# Patient Record
Sex: FEMALE | Race: OTHER | Employment: UNEMPLOYED | URBAN - METROPOLITAN AREA
[De-identification: names, ages, dates, MRNs, and addresses within clinical notes are randomized per-mention and may not be internally consistent; named-entity substitution may affect disease eponyms.]

---

## 2016-10-13 LAB
ANTIBODY SCREEN, EXTERNAL: NEGATIVE
CHLAMYDIA, EXTERNAL: NEGATIVE
HBSAG, EXTERNAL: NEGATIVE
HIV, EXTERNAL: NON REACTIVE
N. GONORRHEA, EXTERNAL: NEGATIVE
RUBELLA, EXTERNAL: NORMAL

## 2016-12-01 LAB — T. PALLIDUM, EXTERNAL: NEGATIVE

## 2017-01-26 LAB — GRBS, EXTERNAL: NEGATIVE

## 2017-02-13 ENCOUNTER — ANESTHESIA EVENT (OUTPATIENT)
Dept: LABOR AND DELIVERY | Age: 27
DRG: 560 | End: 2017-02-13
Payer: COMMERCIAL

## 2017-02-13 ENCOUNTER — ANESTHESIA (OUTPATIENT)
Dept: LABOR AND DELIVERY | Age: 27
DRG: 560 | End: 2017-02-13
Payer: COMMERCIAL

## 2017-02-13 ENCOUNTER — HOSPITAL ENCOUNTER (INPATIENT)
Age: 27
LOS: 2 days | Discharge: HOME | DRG: 560 | End: 2017-02-15
Attending: OBSTETRICS & GYNECOLOGY | Admitting: OBSTETRICS & GYNECOLOGY
Payer: COMMERCIAL

## 2017-02-13 PROBLEM — Z34.90 PREGNANCY: Status: ACTIVE | Noted: 2017-02-13

## 2017-02-13 LAB
AMPHET UR QL SCN: NEGATIVE
BARBITURATES UR QL SCN: NEGATIVE
BASOPHILS # BLD AUTO: 0 K/UL (ref 0–0.1)
BASOPHILS # BLD: 0 % (ref 0–1)
BENZODIAZ UR QL: NEGATIVE
CANNABINOIDS UR QL SCN: POSITIVE
COCAINE UR QL SCN: NEGATIVE
DRUG SCRN COMMENT,DRGCM: ABNORMAL
EOSINOPHIL # BLD: 0.1 K/UL (ref 0–0.4)
EOSINOPHIL NFR BLD: 1 % (ref 0–7)
ERYTHROCYTE [DISTWIDTH] IN BLOOD BY AUTOMATED COUNT: 13.5 % (ref 11.5–14.5)
HCT VFR BLD AUTO: 36.3 % (ref 35–47)
HGB BLD-MCNC: 12 G/DL (ref 11.5–16)
LYMPHOCYTES # BLD AUTO: 20 % (ref 12–49)
LYMPHOCYTES # BLD: 2.7 K/UL (ref 0.8–3.5)
MCH RBC QN AUTO: 28.4 PG (ref 26–34)
MCHC RBC AUTO-ENTMCNC: 33.1 G/DL (ref 30–36.5)
MCV RBC AUTO: 85.8 FL (ref 80–99)
METHADONE UR QL: NEGATIVE
MONOCYTES # BLD: 0.6 K/UL (ref 0–1)
MONOCYTES NFR BLD AUTO: 5 % (ref 5–13)
NEUTS SEG # BLD: 10 K/UL (ref 1.8–8)
NEUTS SEG NFR BLD AUTO: 74 % (ref 32–75)
OPIATES UR QL: NEGATIVE
PCP UR QL: NEGATIVE
PLATELET # BLD AUTO: 226 K/UL (ref 150–400)
RBC # BLD AUTO: 4.23 M/UL (ref 3.8–5.2)
WBC # BLD AUTO: 13.4 K/UL (ref 3.6–11)

## 2017-02-13 PROCEDURE — 75410000000 HC DELIVERY VAGINAL/SINGLE

## 2017-02-13 PROCEDURE — 85025 COMPLETE CBC W/AUTO DIFF WBC: CPT | Performed by: OBSTETRICS & GYNECOLOGY

## 2017-02-13 PROCEDURE — 76060000078 HC EPIDURAL ANESTHESIA

## 2017-02-13 PROCEDURE — 74011250636 HC RX REV CODE- 250/636

## 2017-02-13 PROCEDURE — 36415 COLL VENOUS BLD VENIPUNCTURE: CPT | Performed by: OBSTETRICS & GYNECOLOGY

## 2017-02-13 PROCEDURE — 74011000258 HC RX REV CODE- 258: Performed by: OBSTETRICS & GYNECOLOGY

## 2017-02-13 PROCEDURE — 75410000002 HC LABOR FEE PER 1 HR

## 2017-02-13 PROCEDURE — 77030034849

## 2017-02-13 PROCEDURE — 10907ZC DRAINAGE OF AMNIOTIC FLUID, THERAPEUTIC FROM PRODUCTS OF CONCEPTION, VIA NATURAL OR ARTIFICIAL OPENING: ICD-10-PCS | Performed by: OBSTETRICS & GYNECOLOGY

## 2017-02-13 PROCEDURE — 77030007880 HC KT SPN EPDRL BBMI -B

## 2017-02-13 PROCEDURE — 65410000002 HC RM PRIVATE OB

## 2017-02-13 PROCEDURE — 3E0R3CZ INTRODUCE REGIONAL ANESTH IN SPINAL CANAL, PERC: ICD-10-PCS | Performed by: OBSTETRICS & GYNECOLOGY

## 2017-02-13 PROCEDURE — 77030003666 HC NDL SPINAL BD -A

## 2017-02-13 PROCEDURE — 80307 DRUG TEST PRSMV CHEM ANLYZR: CPT | Performed by: OBSTETRICS & GYNECOLOGY

## 2017-02-13 PROCEDURE — 77030018749 HC HK AMNIO DISP DERY -A

## 2017-02-13 PROCEDURE — 74011250637 HC RX REV CODE- 250/637: Performed by: OBSTETRICS & GYNECOLOGY

## 2017-02-13 PROCEDURE — 77030014125 HC TY EPDRL BBMI -B: Performed by: ANESTHESIOLOGY

## 2017-02-13 PROCEDURE — 00HU33Z INSERTION OF INFUSION DEVICE INTO SPINAL CANAL, PERCUTANEOUS APPROACH: ICD-10-PCS | Performed by: OBSTETRICS & GYNECOLOGY

## 2017-02-13 PROCEDURE — 75410000003 HC RECOV DEL/VAG/CSECN EA 0.5 HR

## 2017-02-13 PROCEDURE — 74011000250 HC RX REV CODE- 250

## 2017-02-13 PROCEDURE — 74011250636 HC RX REV CODE- 250/636: Performed by: OBSTETRICS & GYNECOLOGY

## 2017-02-13 PROCEDURE — 77030021125

## 2017-02-13 RX ORDER — SODIUM CHLORIDE 0.9 % (FLUSH) 0.9 %
5-10 SYRINGE (ML) INJECTION EVERY 8 HOURS
Status: DISCONTINUED | OUTPATIENT
Start: 2017-02-13 | End: 2017-02-15 | Stop reason: HOSPADM

## 2017-02-13 RX ORDER — AMPICILLIN 1 G/1
INJECTION, POWDER, FOR SOLUTION INTRAMUSCULAR; INTRAVENOUS
Status: DISCONTINUED
Start: 2017-02-13 | End: 2017-02-13

## 2017-02-13 RX ORDER — BUPIVACAINE HYDROCHLORIDE AND EPINEPHRINE 2.5; 5 MG/ML; UG/ML
INJECTION, SOLUTION EPIDURAL; INFILTRATION; INTRACAUDAL; PERINEURAL
Status: DISCONTINUED
Start: 2017-02-13 | End: 2017-02-13

## 2017-02-13 RX ORDER — BUPIVACAINE HYDROCHLORIDE AND EPINEPHRINE 2.5; 5 MG/ML; UG/ML
INJECTION, SOLUTION EPIDURAL; INFILTRATION; INTRACAUDAL; PERINEURAL AS NEEDED
Status: DISCONTINUED | OUTPATIENT
Start: 2017-02-13 | End: 2017-02-13 | Stop reason: HOSPADM

## 2017-02-13 RX ORDER — OXYTOCIN IN 5 % DEXTROSE 30/500 ML
PLASTIC BAG, INJECTION (ML) INTRAVENOUS
Status: COMPLETED
Start: 2017-02-13 | End: 2017-02-13

## 2017-02-13 RX ORDER — FENTANYL/BUPIVACAINE/NS/PF 2-1250MCG
PREFILLED PUMP RESERVOIR EPIDURAL
Status: COMPLETED
Start: 2017-02-13 | End: 2017-02-13

## 2017-02-13 RX ORDER — OXYTOCIN/RINGER'S LACTATE 20/1000 ML
125-500 PLASTIC BAG, INJECTION (ML) INTRAVENOUS ONCE
Status: ACTIVE | OUTPATIENT
Start: 2017-02-13 | End: 2017-02-14

## 2017-02-13 RX ORDER — SODIUM CHLORIDE, SODIUM LACTATE, POTASSIUM CHLORIDE, CALCIUM CHLORIDE 600; 310; 30; 20 MG/100ML; MG/100ML; MG/100ML; MG/100ML
125 INJECTION, SOLUTION INTRAVENOUS CONTINUOUS
Status: DISCONTINUED | OUTPATIENT
Start: 2017-02-13 | End: 2017-02-13

## 2017-02-13 RX ORDER — AMOXICILLIN 250 MG/1
500 CAPSULE ORAL EVERY 8 HOURS
Status: DISCONTINUED | OUTPATIENT
Start: 2017-02-13 | End: 2017-02-15 | Stop reason: HOSPADM

## 2017-02-13 RX ORDER — SODIUM CHLORIDE 0.9 % (FLUSH) 0.9 %
5-10 SYRINGE (ML) INJECTION EVERY 8 HOURS
Status: DISCONTINUED | OUTPATIENT
Start: 2017-02-13 | End: 2017-02-13 | Stop reason: HOSPADM

## 2017-02-13 RX ORDER — NALOXONE HYDROCHLORIDE 0.4 MG/ML
0.4 INJECTION, SOLUTION INTRAMUSCULAR; INTRAVENOUS; SUBCUTANEOUS AS NEEDED
Status: DISCONTINUED | OUTPATIENT
Start: 2017-02-13 | End: 2017-02-13

## 2017-02-13 RX ORDER — SODIUM CHLORIDE 900 MG/100ML
INJECTION INTRAVENOUS
Status: DISCONTINUED
Start: 2017-02-13 | End: 2017-02-13

## 2017-02-13 RX ORDER — OXYTOCIN IN 5 % DEXTROSE 30/500 ML
1-25 PLASTIC BAG, INJECTION (ML) INTRAVENOUS
Status: DISCONTINUED | OUTPATIENT
Start: 2017-02-13 | End: 2017-02-13

## 2017-02-13 RX ORDER — SODIUM CHLORIDE 0.9 % (FLUSH) 0.9 %
5-10 SYRINGE (ML) INJECTION AS NEEDED
Status: DISCONTINUED | OUTPATIENT
Start: 2017-02-13 | End: 2017-02-13

## 2017-02-13 RX ORDER — IBUPROFEN 400 MG/1
800 TABLET ORAL EVERY 8 HOURS
Status: DISCONTINUED | OUTPATIENT
Start: 2017-02-13 | End: 2017-02-15 | Stop reason: HOSPADM

## 2017-02-13 RX ORDER — NALOXONE HYDROCHLORIDE 0.4 MG/ML
0.4 INJECTION, SOLUTION INTRAMUSCULAR; INTRAVENOUS; SUBCUTANEOUS AS NEEDED
Status: DISCONTINUED | OUTPATIENT
Start: 2017-02-13 | End: 2017-02-15 | Stop reason: HOSPADM

## 2017-02-13 RX ORDER — ACETAMINOPHEN 325 MG/1
650 TABLET ORAL
Status: DISCONTINUED | OUTPATIENT
Start: 2017-02-13 | End: 2017-02-15 | Stop reason: HOSPADM

## 2017-02-13 RX ORDER — FENTANYL/BUPIVACAINE/NS/PF 2-1250MCG
1-16 PREFILLED PUMP RESERVOIR EPIDURAL CONTINUOUS
Status: DISCONTINUED | OUTPATIENT
Start: 2017-02-13 | End: 2017-02-13

## 2017-02-13 RX ORDER — HYDROCORTISONE ACETATE PRAMOXINE HCL 2.5; 1 G/100G; G/100G
CREAM TOPICAL AS NEEDED
Status: DISCONTINUED | OUTPATIENT
Start: 2017-02-13 | End: 2017-02-15 | Stop reason: HOSPADM

## 2017-02-13 RX ADMIN — AMOXICILLIN 500 MG: 250 CAPSULE ORAL at 15:29

## 2017-02-13 RX ADMIN — BUPIVACAINE HYDROCHLORIDE AND EPINEPHRINE 5 ML: 2.5; 5 INJECTION, SOLUTION EPIDURAL; INFILTRATION; INTRACAUDAL; PERINEURAL at 09:11

## 2017-02-13 RX ADMIN — FENTANYL 0.2 MG/100ML-BUPIV 0.125%-NACL 0.9% EPIDURAL INJ 12 ML: 2/0.125 SOLUTION at 09:26

## 2017-02-13 RX ADMIN — BUPIVACAINE HYDROCHLORIDE AND EPINEPHRINE 5 ML: 2.5; 5 INJECTION, SOLUTION EPIDURAL; INFILTRATION; INTRACAUDAL; PERINEURAL at 09:16

## 2017-02-13 RX ADMIN — Medication 12 ML: at 09:26

## 2017-02-13 RX ADMIN — SODIUM CHLORIDE, SODIUM LACTATE, POTASSIUM CHLORIDE, AND CALCIUM CHLORIDE 125 ML/HR: 600; 310; 30; 20 INJECTION, SOLUTION INTRAVENOUS at 07:40

## 2017-02-13 RX ADMIN — AMPICILLIN SODIUM 1 G: 1 INJECTION, POWDER, FOR SOLUTION INTRAMUSCULAR; INTRAVENOUS at 09:53

## 2017-02-13 RX ADMIN — IBUPROFEN 800 MG: 400 TABLET ORAL at 14:35

## 2017-02-13 RX ADMIN — IBUPROFEN 800 MG: 400 TABLET ORAL at 22:12

## 2017-02-13 RX ADMIN — Medication 2 MILLI-UNITS/MIN: at 08:30

## 2017-02-13 RX ADMIN — ACETAMINOPHEN 650 MG: 325 TABLET, FILM COATED ORAL at 19:37

## 2017-02-13 RX ADMIN — SODIUM CHLORIDE, SODIUM LACTATE, POTASSIUM CHLORIDE, AND CALCIUM CHLORIDE 999 ML/HR: 600; 310; 30; 20 INJECTION, SOLUTION INTRAVENOUS at 09:20

## 2017-02-13 RX ADMIN — BUPIVACAINE HYDROCHLORIDE AND EPINEPHRINE 0.5 ML: 2.5; 5 INJECTION, SOLUTION EPIDURAL; INFILTRATION; INTRACAUDAL; PERINEURAL at 09:10

## 2017-02-13 NOTE — LACTATION NOTE
This note was copied from a baby's chart. THC positive drug screen 17  Mother has insisted on putting infant to breast and is found breastfeeding on [address] [facility] rounds. Asked mother if she was aware of adverse effects of THC on infants and she said no, she had not researched. Provided literature with information from AAP listing marijuana of concern which harmful effects have been reported. Literature given states marijuana can cause sleepiness in babies, leading to slow weight gain and possibly slow growth and developmental stages. Babies who's mothers smoke marijuana regularly have a higher risk of SIDS. Asked mother to carefully consider what she is exposing her  to. She is aware that follow up will be provided with  UDS and meconium, referral to care management and CPS, along with her pediatricians knowledge of her choices.

## 2017-02-13 NOTE — IP AVS SNAPSHOT
[address] [facility] 
[phone] Patient: Moon Peterson MRN: [id number] :1990 You are allergic to the following No active allergies Recent Documentation Height Weight Breastfeeding? BMI OB Status Smoking Status 1.676 m 86.2 kg Yes 30.67 kg/m2 Recent pregnancy Former Smoker Unresulted Labs Order Current Status SAMPLE TO BLOOD BANK In process Emergency Contacts Name Discharge Info Relation Home Work Mobile KATTY Libox  Mother [14] [phone] [address] CAREGIVER [3] Boyfriend [17] [phone] About your hospitalization You were admitted on:  2017 You last received care in the:  [facility] You were discharged on:  February 15, 2017 Unit phone number:  [phone] Why you were hospitalized Your primary diagnosis was:  Not on File Your diagnoses also included:  Pregnancy Providers Seen During Your Hospitalizations Provider Role Specialty Primary office phone Reshma Amaro MD Attending Provider Obstetrics & Gynecology [phone] Your Primary Care Physician (PCP) Primary Care Physician Office Phone Office Fax UNKNOWN, PROVIDER ** None ** ** None ** Follow-up Information Follow up With Details Comments Contact Info Provider Unknown   Patient not available to ask Reshma Amaro MD  Follow up with Dr. Amanda Jeffries in 6 weeks and Urologist at [facility] as scheduled Millieida Janiae [facility] [address] [facility] 
[phone] Current Discharge Medication List  
  
START taking these medications Dose & Instructions Dispensing Information Comments Morning Noon Evening Bedtime  
 amoxicillin 500 mg capsule Commonly known as:  AMOXIL Your next dose is: Today, Tomorrow Other:  _________  Dose:  500 mg  
 Take 1 Cap by mouth every eight (8) hours for 7 days. Quantity:  21 Cap Refills:  0  
     
   
   
   
  
 ibuprofen 600 mg tablet Commonly known as:  MOTRIN Your next dose is: Today, Tomorrow Other:  _________ Dose:  600 mg Take 1 Tab by mouth every six (6) hours as needed for Pain for up to 360 days. Quantity:  30 Tab Refills:  0  
     
   
   
   
  
 oxyCODONE-acetaminophen 5-325 mg per tablet Commonly known as:  PERCOCET Your next dose is: Today, Tomorrow Other:  _________ Dose:  1-2 Tab Take 1-2 Tabs by mouth every four (4) hours as needed for Pain. Max Daily Amount: 12 Tabs. Quantity:  30 Tab Refills:  0 CONTINUE these medications which have NOT CHANGED Dose & Instructions Dispensing Information Comments Morning Noon Evening Bedtime PRENATAL DHA+COMPLETE PRENATAL -300 mg-mcg-mg Cmpk Generic drug:  PNV no.24-iron-folic acid-dha Your next dose is: Today, Tomorrow Other:  _________ Take  by mouth. Refills:  0 ASK your doctor about these medications Dose & Instructions Dispensing Information Comments Morning Noon Evening Bedtime KEFLEX 500 mg capsule Generic drug:  cephALEXin Your next dose is: Today, Tomorrow Other:  _________ Dose:  500 mg Take 500 mg by mouth two (2) times a day. Refills:  0 Where to Get Your Medications Information on where to get these meds will be given to you by the nurse or doctor. ! Ask your nurse or doctor about these medications  
  amoxicillin 500 mg capsule  
 ibuprofen 600 mg tablet  
 oxyCODONE-acetaminophen 5-325 mg per tablet Discharge Instructions POST DELIVERY DISCHARGE INSTRUCTIONS Name: Mak [facility] YOB: 1990 Primary Diagnosis: Active Problems: 
  Pregnancy (2/13/2017) General:  
 
Diet/Diet Restrictions: 
· Eight 8-ounce glasses of fluid daily (water, juices); avoid excessive caffeine intake. · Meals/snacks as desired which are high in fiber and carbohydrates and low in fat and cholesterol. Medications:  
 
 
 
Physical Activity / Restrictions / Safety: · Avoid heavy lifting, no more that 8 lbs. For 2-3 weeks;  
· Limit use of stairs to 2 times daily for the first week home. · No driving for one week. · Avoid intercourse 4-6 weeks, no douching or tampon use. · Check with obstetrician before starting or resuming an exercise program.   
 
Discharge Instructions/Special Treatment/Home Care Needs:  
 
· Continue prenatal vitamins. · Continue to use squirt bottle with warm water on your episiotomy after each bathroom use until bleeding stops. · If steri-strips applied to your incision, remove in 7-10 days. Call your doctor for the following: · Fever over 101 degrees by mouth. · Vaginal bleeding heavier than a normal menstrual period or clots larger than a golf ball. · Red streaks or increased swelling of legs, painful red streaks on your breast. 
· Painful urination, constipation and increased pain or swelling or discharge with your incision. · If you feel extremely anxious or overwhelmed. · If you have thoughts of harming yourself and/or your baby. Pain Management:  
 
· Take Acetaminophen (Tylenol) or Ibuprofen (Advil, Motrin), as directed for pain. · Use a warm Sitz bath 3 times daily to relieve episiotomy or hemorrhoidal discomfort. · For hemorrhoidal discomfort, use Tucks and Anusol cream as needed and directed. · Heating pad to  incision as needed. Follow-Up Care:  
 
Appointment with MD: Follow-up Appointments Procedures  FOLLOW UP VISIT Appointment in: 6 Weeks With Dr. Wanda Lozano for postpartum check and with urologicst at [facility] as scheduled With Dr. Wanda Lozano for postpartum check and with urologicst at [facility] as scheduled Standing Status:   Standing Number of Occurrences:   1 Order Specific Question:   Appointment in Answer:   6 Weeks Telephone number: [phone] Signed By: Mendel Can, MD                                                                                                   Date: 2/15/2017 Time: 9:37 AM 
 
Discharge Orders None Ludi labshart Announcement We are excited to announce that we are making your provider's discharge notes available to you in Abril. You will see these notes when they are completed and signed by the physician that discharged you from your recent hospital stay. If you have any questions or concerns about any information you see in FanIQt, please call the Health Information Department where you were seen or reach out to your Primary Care Provider for more information about your plan of care. Introducing [facility]! [employer] introduces Abril patient portal. Now you can access parts of your medical record, email your doctor's office, and request medication refills online. 1. In your internet browser, go to [email or web address] 2. Click on the First Time User? Click Here link in the Sign In box. You will see the New Member Sign Up page. 3. Enter your Abril Access Code exactly as it appears below. You will not need to use this code after youve completed the sign-up process. If you do not sign up before the expiration date, you must request a new code. · Abril Access Code: [email or web address] Expires: 5/16/2017 10:05 AM 
 
4. Enter the last four digits of your Social Security Number (xxxx) and Date of Birth (mm/dd/yyyy) as indicated and click Submit. You will be taken to the next sign-up page. 5. Create a Abril ID. This will be your Abril login ID and cannot be changed, so think of one that is secure and easy to remember. 6. Create a FanIQt password. You can change your password at any time. 7. Enter your Password Reset Question and Answer. This can be used at a later time if you forget your password. 8. Enter your e-mail address. You will receive e-mail notification when new information is available in [address] 9. Click Sign Up. You can now view and download portions of your medical record. 10. Click the Download Summary menu link to download a portable copy of your medical information. If you have questions, please visit the Frequently Asked Questions section of the [email or web address] website. Remember, [email or web address] is NOT to be used for urgent needs. For medical emergencies, dial 911. Now available from your iPhone and Android! General Information Please provide this summary of care documentation to your next provider. Patient Signature:  ____________________________________________________________ Date:  ____________________________________________________________  
  
Dio Mejia Provider Signature:  ____________________________________________________________ Date:  ____________________________________________________________

## 2017-02-13 NOTE — ANESTHESIA PREPROCEDURE EVALUATION
Anesthetic History   No history of anesthetic complications            Review of Systems / Medical History  Patient summary reviewed, nursing notes reviewed and pertinent labs reviewed    Pulmonary  Within defined limits                 Neuro/Psych   Within defined limits           Cardiovascular  Within defined limits                Exercise tolerance: >4 METS     GI/Hepatic/Renal  Within defined limits              Endo/Other  Within defined limits           Other Findings   Comments: IUP           Physical Exam    Airway  Mallampati: II  TM Distance: 4 - 6 cm  Neck ROM: normal range of motion   Mouth opening: Normal     Cardiovascular               Dental  No notable dental hx       Pulmonary                 Abdominal  GI exam deferred       Other Findings            Anesthetic Plan    ASA: 2  Anesthesia type: spinal and epidural            Anesthetic plan and risks discussed with: Patient

## 2017-02-13 NOTE — L&D DELIVERY NOTE
Delivery Summary  Patient: Linden Weiner             Circumcision:   NA-female  Additional Delivery Comments - . Female. Information for the patient's :  Priscila Smaller [[id number]]       Labor Events:    Labor: No   Rupture Date: 2017   Rupture Time: 8:25 AM   Rupture Type AROM   Amniotic Fluid Volume: Moderate    Amniotic Fluid Description: Clear     Induction: AROM; Oxytocin       Augmentation: None   Labor Events: None     Cervical Ripening:     None     Delivery Events:  Episiotomy: None   Laceration(s): Right periurethral     Repaired: None    Number of Repair Packets:     Suture Type and Size: None     Estimated Blood Loss (ml): 250ml       Delivery Date: 2017    Delivery Time: 12:13 PM  Delivery Type: Vaginal, Spontaneous Delivery  Sex:  Female     Gestational Age: 38w11d   Delivery Clinician:  Yaquelin Cerna  Living Status: Yes   Delivery Location: L&D [facility]          APGARS  One minute Five minutes Ten minutes   Skin color: 0   1        Heart rate: 2   2        Grimace: 2   2        Muscle tone: 2   2        Breathin   2        Totals: 8   9            Presentation: Vertex    Position:        Resuscitation Method:  Suctioning-bulb; Tactile Stimulation     Meconium Stained: None      Cord Vessels: 3 Vessels      Cord Events:    Cord Blood Sent?:  No    Blood Gases Sent?:  No    Placenta:  Date/Time:  12:18 PM  Removal: Spontaneous      Appearance: Intact      Measurements:  Birth Weight: 3.1 kg      Birth Length: 50.8 cm      Head Circumference: 33 cm      Chest Circumference: 30.5 cm     Abdominal Girth: 33 cm    Other Providers:   TRISTAN VELOZ;BARRY PEGUERO;MIRA HUMMEL;NIGHAT MENDOZA, Obstetrician;Primary Nurse;Primary  Nurse;Charge Nurse           Cord pH:  none    Episiotomy: None   Laceration(s): Right periurethral      Estimated Blood Loss (ml): 250    Labor Events  Method: AROM; Oxytocin       Augmentation: None   Cervical Ripening: None        Operative Vaginal Delivery - none    Group B Strep:   Lab Results   Component Value Date/Time    GrBStrep, External negative 2017     Information for the patient's :  Jevon Wright [[id number]]   No results found for: ABORH, PCTABR, PCTDIG, BILI, ABORHEXT, ABORH    No results found for: APH, APCO2, APO2, AHCO3, ABEC, ABDC, O2ST, EPHV, PCO2V, PO2V, HCO3V, EBEV, EBDV, SITE, RSCOM

## 2017-02-13 NOTE — PROGRESS NOTES
0700- pt arrived from home for scheduled elective indx. , prev IUFD, pt denies any leaking of fluid, bleeding, spots in eyes, but reports some contractions. Pt to br and gowned, then back to bed on efm and toco. Consents reviewed. 0825-  in. Strip reviewed. sve done with arom. Clear fluid noted. Pt requesting epidural, fluid bolus started    0845- pt sts has been using marijuana, drug screen explained and done. Pt educated on no breast feeding due to marijuana use    0900- dr. Kathi Kennedy in. Hx reviewed. Time out done. Pt to sitting position, maternal hr tracing intermittently    0935- pt c/o some pain on left side, pt repositioned to far left and pcea button given with education    1010- pt sts pain on left side continues. Dr. Kathi Kennedy notified, will come assess shortly    1030- pt getting relief at this time. Dr. Kathi Kennedy made aware     50 [id number]- dr. Mahesh Diop in. Strip reviewed. sve done.  Pt complete    1213-  of live female infant    0- dr. Mahesh Diop ordered urology consult    9502- dr. Mohsen Sheehan called back and will have someone consult on pt    12- sbar report to arnel moss

## 2017-02-13 NOTE — ANESTHESIA PROCEDURE NOTES
CSE Block    Performed by: Rivka Conde  Authorized by: Rivka Conde     Pre-Procedure      OB Combined Spinal-Epidural Note    Risk and benefits were discussed with the patient and plans are to proceed. Patient was placed in the seated position. The back was prepped at the lumbar region with Duraprep. 3 ml 0.25% bupivacaine with 1:200K epinephrine  was used as local at L3 - L4. A 17 Tuohy needle was passed x 1 attempt(s) at the above stated level. Loss of resistance at 7 cm, deviated 2 cm to left. A 25 g pencil point spinal needle was placed through the Touhy until CSF was obtained. 0.5 mL 0.25% bupivacaine with 1:200K epinephrine was deposited into the CSF. A 19 g spring type epidural catheter was placed through the Touhy and secured at 12 cm at the skin. Test dose with 5 mL 0.25% bupivacaine with 1:200 epinephrine was negative. No paresthesia.     Ashley Thompson MD  2/13/2017

## 2017-02-13 NOTE — PROGRESS NOTES
TRANSFER - IN REPORT:    Verbal report received from JOSE ANGEL Calles RN(name) on [facility] [place]  being received from L&D(unit) for routine progression of care      Report consisted of patients Situation, Background, Assessment and   Recommendations(SBAR). Information from the following report(s) SBAR, Procedure Summary, Intake/Output, MAR and Recent Results was reviewed with the receiving nurse. Opportunity for questions and clarification was provided. Assessment completed upon patients arrival to unit and care assumed.

## 2017-02-13 NOTE — IP AVS SNAPSHOT
Current Discharge Medication List  
  
Take these medications at their scheduled times Dose & Instructions Dispensing Information Comments Morning Noon Evening Bedtime  
 amoxicillin 500 mg capsule Commonly known as:  AMOXIL Your next dose is: Today, Tomorrow Other:  ____________ Dose:  500 mg Take 1 Cap by mouth every eight (8) hours for 7 days. Quantity:  21 Cap Refills:  0 Take these medications as needed Dose & Instructions Dispensing Information Comments Morning Noon Evening Bedtime  
 ibuprofen 600 mg tablet Commonly known as:  MOTRIN Your next dose is: Today, Tomorrow Other:  ____________ Dose:  600 mg Take 1 Tab by mouth every six (6) hours as needed for Pain for up to 360 days. Quantity:  30 Tab Refills:  0  
     
   
   
   
  
 oxyCODONE-acetaminophen 5-325 mg per tablet Commonly known as:  PERCOCET Your next dose is: Today, Tomorrow Other:  ____________ Dose:  1-2 Tab Take 1-2 Tabs by mouth every four (4) hours as needed for Pain. Max Daily Amount: 12 Tabs. Quantity:  30 Tab Refills:  0 Take these medications as directed Dose & Instructions Dispensing Information Comments Morning Noon Evening Bedtime PRENATAL DHA+COMPLETE PRENATAL -300 mg-mcg-mg Cmpk Generic drug:  PNV no.24-iron-folic acid-dha Your next dose is: Today, Tomorrow Other:  ____________ Take  by mouth. Refills:  0 ASK your doctor about these medications Dose & Instructions Dispensing Information Comments Morning Noon Evening Bedtime KEFLEX 500 mg capsule Generic drug:  cephALEXin Your next dose is: Today, Tomorrow Other:  ____________ Dose:  500 mg Take 500 mg by mouth two (2) times a day. Refills:  0 Where to Get Your Medications Information about where to get these medications is not yet available ! Ask your nurse or doctor about these medications  
  amoxicillin 500 mg capsule  
 ibuprofen 600 mg tablet  
 oxyCODONE-acetaminophen 5-325 mg per tablet

## 2017-02-13 NOTE — H&P
EDC:02/15/2017  EGA: 39 weeks, 5 days      History of Present Illness:  28 yo  at 44 5/7 weeks for IOL. preg complicated by chronic hydronephrosis, on suppression. h/o alcohol and MJ use with preg.  h/o IUFD. Reassuring surveillance but pooor complicance with surveillance. Patient's Prenatal Care with Doctor of Record Rodo Penaloza MD Notable For -    *Note: IOL sched for 17 at 6:30am  Son with leukodystrophy-unknown type, seeing [facility] 16-resched from 10/24  Abnormality in fetus know or suspected-left renal agenisis-MaterniT21-screen neg  UTI pregnant ANEESH ____  Anemia on FE pregnant  Alcohol use in pregnancy-drinks daily-stopped at 18 weeks  High risk pregnancy  Kidney stent (right)  chronic hydronephrosis-pregnant (followed by urology)-UPJ obstruction  Prev. Preg w/ Poor Outcome   Lab Screening          Impression & Recommendations:    Problem # 1:  High risk pregnancy (ICD-V23.89) (LTP67-E75.893)  Favorable cervix. arom. pitocin if needed. epidural prn. Problem # 2:  UTI pregnant ANEESH ____ (ASR-416.90) (ZKC21-H07.13)  recent + enterococcus culture.   will treat while in hospital.          Past Medical History:     Reviewed history from 10/20/2016 and no changes required:        kidney doesnt drain properly, has a stent-Ureteropelvic junction obstruction dx at 16 yrs per pt        Drinks alcohol daily-stopped at 18 weeks gestation    Past Surgical History:     Reviewed history from 2015 and no changes required:        right kidney stent        Vaginal Delivery        [id number] , Male  Dr. Isiah Sen    Family History Summary:      Reviewed history Last on 10/30/2016 and no changes required:2017  Mother Phong Ballard.) - Has Family History of Diabetes - Entered On: 2015  Mother Phong Ballard.) - Has Family History of Hypertension - Entered On: 2015  Son Phong Ballard.) - Has Family History of Other Medical Problems - Sickle Cell carrier - Entered On: 2015  Father Kirit Santo.) - Has Family History of Other Cancer - basal cell skin cancer - Entered On: 10/20/2016    General Comments - FH:  Family history transferred to [facility] compliant      Social History:     Reviewed history from 10/13/2016 and no changes required:        Single                Smoking History:        Patient has never smoked. Allergies      Medications Removed from Medication List        Flowsheet View for Follow-up Visit     Estimated weeks of        gestation:  39 5/7     Cx Dilation:  4-5     Cx Effacement: 80%     Cx Station:  -2     Preceptor:  mp      Vital Signs      FHT Descrip:    reactive        Physical Exam     General           General appearance:  no acute distress                  Dilation: : 4-5                  Effacement:  80%                  Station:  -2            Impression & Recommendations:    Problem # 1:  High risk pregnancy (ICD-V23.89) (OAG71-J24.893)  Favorable cervix. arom. pitocin if needed. epidural prn. Problem # 2:  UTI pregnant ANEESH ____ (THM-415.38) (NXQ72-N73.13)  recent + enterococcus culture. will treat while in hospital.      Medications (at conclusion of this visit)    02/09/2017 MACROBID 100 MG ORAL CAPS (NITROFURANTOIN MONOHYD MACRO) 1 PO BID  10/13/2016 PNV PRENATAL PLUS MULTIVITAMIN 27-1 MG ORAL TABS (PRENATAL VIT-FE FUMARATE-FA) take one by mouth daily          LABORATORY DATA   TEST DATE RESULT   Group B Strep culture 01/26/2017 Negative                                   (Group B Strep Culture Result Field)   Blood Type 10/13/2016 O                                             (Blood Type Result Field)   Rh 10/13/2016 Positive                                   (Rh Result Field)   Rhogam Inj Given 07/30/2015 *   Tdap Vaccine Given 01/26/2017 Vacc.  [address]   Antibody Screen 12/01/2016 Negative   Rubella  [facility] Reference Ranges On or After 3/10/14                  <0.90              Non-immune      0.90 - 0.99     Equivocal      >0.99              Immune [facility] Reference Ranges  Before 3/10/14           <5                 Non-immune             5 - 9               Equivocal            >9                 Immune  [facility] Reference Ranges       < Or = 0.90       Negative             0.91-1.09          Equivocal            > Or = 1.10       Positive   10/13/2016     1.45     TPA (T Pallidum Antibodies) 12/01/2016 Negative   Serology (RPR) 07/30/2015 *   HBsAg 10/13/2016 Negative   HIV 10/13/2016 Non Reactive   Hemoglobin 12/01/2016 10.9   Hematocrit 12/01/2016 32.4   Platelets 07/59/8850 282 X10E3/UL   TSH 07/30/2015 *   Urine Culture 01/26/2017 Negative   GC DNA Probe 10/13/2016 Negative   Chlamydia DNA 10/13/2016 Negative   PAP 10/13/2016 NIL   Flu Vaccine Given 12/22/2016 declined   HGBA1C 07/30/2015 *   HGB Electro 06/04/2015 AA   T4, Free 07/30/2015 *   BG Fasting 07/30/2015 *   GTT 1H 50G 12/01/2016 97   GTT 1H 100G 07/30/2015 *   GTT 2H 100G 07/30/2015 *   GTT 3H 100G 07/30/2015 *   Glucose Plasma 07/30/2015 *   CF Accept or Decline 10/20/2016 declined   CF Screen Result     Nuchal Trans 10/13/2016 Too Late   AFP Only     Tetra 10/13/2016 Too Late   AFP Serum 07/30/2015 *   CVS 07/30/2015 *   AFP Amniotic 07/30/2015 *   Amnio Karyo 10/20/2016 declined   FISH 07/30/2015 *   GC Culture 07/30/2015 *   Chlamydia Cult 07/30/2015 *   Ureaplasma     Mycoplasma     WBC 10/13/2016 9.4 X10E3/UL   RBC 10/13/2016 3.82 X10E6/UL   MCV 10/13/2016 88   MCH 10/13/2016 29.3   MCHC RBC 10/13/2016 33.2     ULTRASOUND DATA   TEST DATE RESULT   Estimated Fetal Weight 02/09/2017 0219^4833 g&grams                                     Weight % 02/09/2017 15^15% %&percent                                                LOGAN 02/09/2017 17.9^17.9 cm&centimeters                    BPP 02/09/2017 8^8 [n/a]&Not applicable   Cervical Length (mm)                 ________________________________________________________________________

## 2017-02-13 NOTE — CONSULTS
Urology Consult    Subjective:     Date of Consultation:  February 13, 2017    Referring Physician: Radha Ring    Reason for Consultation:  Chronic stent    Patient Name: Ortiz Yeung  MRN: [id number]    History of Present Illness:   Patient is a 27 y.o.  female who is being seen for  history of right hydronephrosis, UPJ obstruction and stent. She was admitted to the hospital for Induction  Pregnancy. she has a history of a right UPJ obstruction with chronic stent changes. Managed most recently at [facility] by Dr. Lesa enrique  She states that her most recent stent change was about 6 or 7 months ago or perhaps just prior to pregnancy. She denies any significant stent discomfort. she is being treated for a chronic UTI. recentlyRchanged to a different antibiotic based on culture from [facility] office. Past Medical History   Diagnosis Date    Abnormal Pap smear     Anemia     Chronic kidney disease     Chronic pain      abdominal,lower right flank    Hydronephrosis, right 8/22/2014      Past Surgical History   Procedure Laterality Date    Hx gyn       vaginal birth x 2    Hx urological       stent placements    Hx other surgical  2016 August      urethral stent    Hx other surgical       urethral stents replaced multiple times       Family History   Problem Relation Age of Onset    Elevated Lipids Mother     Hypertension Mother     Cancer Paternal Aunt     Cancer Paternal Uncle     Arthritis-osteo Maternal Grandmother     Diabetes Maternal Grandmother     Cancer Paternal Grandmother     Diabetes Paternal Grandmother       Social History   Substance Use Topics    Smoking status: Former Smoker     Years: 2.00    Smokeless tobacco: Never Used    Alcohol use No      Comment: ocassionaly     No Known Allergies   Prior to Admission medications    Medication Sig Start Date End Date Taking? Authorizing Provider   PNV no.24-iron-folic acid-dha (PRENATAL DHA+COMPLETE PRENATAL) [id number] mg-mcg-mg cmpk Take  by mouth. Yes Historical Provider   cephALEXin (KEFLEX) 500 mg capsule Take 500 mg by mouth two (2) times a day.     Historical Provider             Objective:     Data Review (Labs):    Recent Labs      02/13/17   0755   WBC  13.4*   HGB  12.0   PLT  226       Patient Vitals for the past 8 hrs:   BP Temp Pulse Resp SpO2 Height Weight   02/13/17 1311 - - - - 97 % - -   02/13/17 1309 111/61 - 60 - - - -   02/13/17 1301 - - - - 98 % - -   02/13/17 1300 108/56 - 64 - - - -   02/13/17 1241 - - - - 98 % - -   02/13/17 1240 118/56 98.2 °F (36.8 °C) 66 8 - - -   02/13/17 1219 129/74 - 72 - - - -   02/13/17 1216 - - - - 98 % - -   02/13/17 1159 136/65 - 71 - (!) 87 % - -   02/13/17 1150 136/76 - 72 - - - -   02/13/17 1149 - - - - (!) 74 % - -   02/13/17 1139 120/70 - 74 - (!) 87 % - -   02/13/17 1130 127/69 98 °F (36.7 °C) 64 18 - - -   02/13/17 1124 - - - - 98 % - -   02/13/17 1120 126/63 - 63 - - - -   02/13/17 1109 127/68 - 62 - 100 % - -   02/13/17 1100 124/64 - 60 - - - -   02/13/17 1059 - - - - 99 % - -   02/13/17 1050 131/74 - 63 - - - -   02/13/17 1049 - - - - 100 % - -   02/13/17 1029 129/74 - 65 - 96 % - -   02/13/17 1019 134/75 - 65 - 97 % - -   02/13/17 1009 129/81 - 65 - 91 % - -   02/13/17 0954 129/69 - 68 - 90 % - -   02/13/17 0949 131/70 - 70 - 91 % - -   02/13/17 0938 129/69 - 71 - 91 % - -   02/13/17 0936 126/70 - 68 - - - -   02/13/17 0934 - - - - 98 % - -   02/13/17 0933 123/64 - 69 - - - -   02/13/17 0932 - - - - 90 % - -   02/13/17 0931 128/67 - 74 - - - -   02/13/17 0929 135/64 - 74 - 100 % - -   02/13/17 0927 129/69 - 70 - - - -   02/13/17 0925 133/68 - 65 - - - -   02/13/17 0924 128/70 - 72 - 99 % - -   02/13/17 0923 - 98.2 °F (36.8 °C) - 18 - - -   02/13/17 0921 131/67 - 70 - (!) 89 % - -   02/13/17 0919 124/58 - 75 - 96 % - -   02/13/17 0917 125/75 - 73 - - - -   02/13/17 0916 124/65 - 78 - - - -   02/13/17 0915 - - - - (!) 74 % - -   02/13/17 0914 - - - - 100 % - -   02/13/17 0853 129/84 - 66 - 91 % - - 17 0823 118/73 - 68 - - - -   17 9698 - - - - 90 % - -   17 0722 125/73 98 °F (36.7 °C) 72 18 99 % - -   17 2807 - - - - - 5' 6\" (1.676 m) 86.2 kg (190 lb)     Temp (24hrs), Av.1 °F (36.7 °C), Min:98 °F (36.7 °C), Max:98.2 °F (36.8 °C)      Intake and Output:        Physical Exam:            General:    alert, cooperative, no distress, appears stated age                     Skin:  Normal.    Ext HEENT unremark, neck supple, resp w/o diff             Abdomen[de-identified]  deferred--post partum             Genitalia:  defer exam          Extremities:  negative       Assessment:     Active Problems:    Pregnancy (2017)           Right UPJ obstruction managed with periodic stent changes. most recently managed through [facility]. Recurrent UTIs. Plan:      She has plans for follow-up with urology at [facility] in the near future. she states that she does plan to keep that follow-up now that she has delivered. e are available if new issues arise.     Signed By: Dustin Gillespie MD                         2017

## 2017-02-14 LAB
ANION GAP BLD CALC-SCNC: 9 MMOL/L (ref 5–15)
BUN SERPL-MCNC: 11 MG/DL (ref 6–20)
BUN/CREAT SERPL: 15 (ref 12–20)
CALCIUM SERPL-MCNC: 7.9 MG/DL (ref 8.5–10.1)
CHLORIDE SERPL-SCNC: 110 MMOL/L (ref 97–108)
CO2 SERPL-SCNC: 22 MMOL/L (ref 21–32)
CREAT SERPL-MCNC: 0.73 MG/DL (ref 0.55–1.02)
GLUCOSE SERPL-MCNC: 86 MG/DL (ref 65–100)
POTASSIUM SERPL-SCNC: 3.8 MMOL/L (ref 3.5–5.1)
SODIUM SERPL-SCNC: 141 MMOL/L (ref 136–145)

## 2017-02-14 PROCEDURE — 80048 BASIC METABOLIC PNL TOTAL CA: CPT | Performed by: OBSTETRICS & GYNECOLOGY

## 2017-02-14 PROCEDURE — 74011250637 HC RX REV CODE- 250/637: Performed by: OBSTETRICS & GYNECOLOGY

## 2017-02-14 PROCEDURE — 36415 COLL VENOUS BLD VENIPUNCTURE: CPT | Performed by: OBSTETRICS & GYNECOLOGY

## 2017-02-14 PROCEDURE — 65410000002 HC RM PRIVATE OB

## 2017-02-14 RX ORDER — OXYCODONE HYDROCHLORIDE 5 MG/1
5 TABLET ORAL
Status: DISCONTINUED | OUTPATIENT
Start: 2017-02-14 | End: 2017-02-15 | Stop reason: HOSPADM

## 2017-02-14 RX ADMIN — AMOXICILLIN 500 MG: 250 CAPSULE ORAL at 01:02

## 2017-02-14 RX ADMIN — IBUPROFEN 800 MG: 400 TABLET ORAL at 07:32

## 2017-02-14 RX ADMIN — IBUPROFEN 800 MG: 400 TABLET ORAL at 23:54

## 2017-02-14 RX ADMIN — IBUPROFEN 800 MG: 400 TABLET ORAL at 15:07

## 2017-02-14 RX ADMIN — ACETAMINOPHEN 650 MG: 325 TABLET, FILM COATED ORAL at 01:31

## 2017-02-14 RX ADMIN — AMOXICILLIN 500 MG: 250 CAPSULE ORAL at 17:53

## 2017-02-14 RX ADMIN — ACETAMINOPHEN 650 MG: 325 TABLET, FILM COATED ORAL at 23:54

## 2017-02-14 RX ADMIN — ACETAMINOPHEN 650 MG: 325 TABLET, FILM COATED ORAL at 17:53

## 2017-02-14 RX ADMIN — OXYCODONE HYDROCHLORIDE 5 MG: 5 TABLET ORAL at 19:31

## 2017-02-14 RX ADMIN — AMOXICILLIN 500 MG: 250 CAPSULE ORAL at 09:39

## 2017-02-14 RX ADMIN — OXYCODONE HYDROCHLORIDE 5 MG: 5 TABLET ORAL at 23:54

## 2017-02-14 NOTE — ADT AUTH CERT NOTES
Notification of Labor and Delivery     `   Allergies (verified on: 02/13/17)    `     (No Known Allergies)   `          `   Diagnosis    `     None   `          IRVING CROWELL ([id number])  Patient Demographics         Name:  [facility]  MRN:  [id number]       Address:  [place] [address]  SSN:             Sex:  Female       Home Phone:  [phone]  YOB: 1990 (26 yrs)       Work Phone:    E-Mail Address:         Registration Status:  Verified  PCP:  JAEL Bingham Do       Date Last Verified:  1/8/2009  Employer:  NOT EMPLOYED       Next Review Date:  2/28/2017                            Hospital Account       Name:  [facility] Account ID:  [de-identified]     Class:  INPATIENT  Status:  Open     [facility] [id number]  Primary Coverage:  [address] [place] [address]     External Hosp Acct ID:  [[id number]]           Guarantor Account Information                 Account ID - Guarantor  Service Area  Active?   Guarantor Account Type  Guarantor Account Status  Vishal Status            [id number] - IRVING CROWELL  Yes  P/F    Verified           Coverages:    [facility]/[place] COVENTRY CARES/[place] MEDICAID       [facility] CARES/[place] MEDICAID       [facility] MEDICAID/[place] [facility] CARES/[place] MEDICAID       AETNA/BSHSI [place]       [place] AETNA BETTER HEALTH OF [facility] CARES/[place] MEDICAID       SELF PAY/BSHSI SELF PAY       SELF PAY/BSHSI SELF PAY       [place]                   Coverage Information         F/O  Payor/Plan  Subscriber Name  Eff From Eff To  Vishal Status     1  COVENTRY/VA COVENT*  SOCRATES,IRVING  02/01/2010 11/30/2010       2  AETNA/BSHSI AET*  SOCRATES,IRVING  02/01/2016         3  AETNA BET*/[place] AETNA *  SOCRATES,IRVING  02/01/2016         4  COVENTRY/VA COVENT*  SOCRATES,IRVING NMN  10/13/2011  10/13/2011       5  SELF PAY/BSHSI MICHAEL*  SOCRATES,IRVNIG  02/01/2014 04/30/2015       6  SELF PAY/BSHSI MICHAEL*  SOCRATES,IRVING  11/03/2015 01/31/2016       N/A  COVENTRY */VA COVENT*  SOCRATES,IRVING  12/01/2011 05/31/2012       N/A  COVENTRY */VA COVENT*  SOCRATES,IRVING  07/29/2015 11/02/2015       N/A  MEDICAID */[place] MEDICA*  SOCRATES,IRVING  10/13/2011  10/31/2011       N/A  MEDICAID */[place] MEDICA*  SOCRATES,IRVING  11/01/2011 12/01/2011       N/A  MEDICAID */[place] MEDICA*  SOCRATES,IRVING  01/01/2011 01/01/2011       N/A  MEDICAID */[place] MEDICA*  SOCRATES,IRVING  05/01/2015 11/02/2015       N/A  MEDICAID */[place] MEDICA*  [id number]  01/06/2016 04/30/2016       N/A  MEDICAID */[place] MEDICA*  [id number]  11/03/2015 05/05/2016            Admission Information       Attending Provider:  Farshad Devries MD  Auth/Cert Status:  Incomplete     Admitting Provider:  Shonda Rodriguez MD  Service Area:  [facility]     Admission Type:  SCHEDULED  Unit:  [facility] Kenna Kaur Service:  OBSTETRICS  Room:  7203     Admission Date:  2/13/2017  Bed:  01     Discharge Date:

## 2017-02-14 NOTE — PROGRESS NOTES
Post-Partum Day Number 1 Progress Note    Peter Cardenas     Assessment: Doing well, post partum day 1  Chronic R ureteral stent - appreciate urology consult   UTI - enterococcus sensitive to PCN on office UCx    Plan:  1. Continue routine postpartum and perineal care as well as maternal education. 2. F/u w/ urologist at [facility] as scheduled  3. Continue amoxicillin    Information for the patient's :  Jevon Wright [[id number]]   Vaginal, Spontaneous Delivery   Patient doing well without significant complaint. Voiding without difficulty, normal lochia. Vitals:  Visit Vitals    /70 (BP 1 Location: Right arm, BP Patient Position: At rest)    Pulse 88    Temp 97.9 °F (36.6 °C)    Resp 16    Ht 5' 6\" (1.676 m)    Wt 86.2 kg (190 lb)    LMP 2016    SpO2 97%    Breastfeeding Yes    BMI 30.67 kg/m2     Temp (24hrs), Av °F (36.7 °C), Min:97.6 °F (36.4 °C), Max:98.3 °F (36.8 °C)        Exam:   Patient without distress. Abdomen soft, fundus firm, nontender                Lower extremities are negative for swelling, cords or tenderness.     Labs:     Lab Results   Component Value Date/Time    WBC 13.4 2017 07:55 AM    WBC 9.2 10/30/2016 08:04 PM    WBC 8.0 2016 06:41 AM    WBC 13.7 07/10/2016 06:09 AM    WBC 14.5 2016 03:17 AM    WBC 19.0 2016 11:26 AM    WBC 13.2 2016 08:05 AM    WBC 8.7 2016 06:09 PM    WBC 5.6 2015 06:47 AM    WBC 12.1 2015 05:33 AM    WBC 7.2 2015 03:28 PM    WBC 16.3 2015 05:16 PM    WBC 9.7 2015 08:30 PM    WBC 8.7 2014 06:30 PM    WBC 5.8 2014 10:10 AM    WBC 10.3 2014 05:55 PM    WBC 15.5 2013 01:45 AM    WBC 10.8 11/15/2011 07:25 AM    WBC 9.8 2010 11:20 PM    WBC 5.7 2009 04:55 PM    HGB 12.0 2017 07:55 AM    HGB 10.7 10/30/2016 08:04 PM    HGB 10.3 2016 06:41 AM    HGB 11.3 07/10/2016 06:09 AM    HGB 10.0 2016 03:17 AM    HGB 12.5 07/08/2016 11:26 AM    HGB 12.1 05/06/2016 08:05 AM    HGB 12.5 01/06/2016 06:09 PM    HGB 13.4 11/17/2015 06:47 AM    HGB 12.3 11/08/2015 05:33 AM    HGB 11.6 11/03/2015 03:28 PM    HGB 11.1 07/29/2015 05:16 PM    HGB 10.1 02/07/2015 08:30 PM    HGB 11.8 08/21/2014 06:30 PM    HGB 11.7 06/12/2014 10:10 AM    HGB 13.3 02/11/2014 05:55 PM    HGB 12.0 11/16/2013 01:45 AM    HGB 11.1 11/15/2011 07:25 AM    HGB 13.1 01/01/2010 11:20 PM    HGB 12.2 09/21/2009 04:55 PM    HCT 36.3 02/13/2017 07:55 AM    HCT 31.9 10/30/2016 08:04 PM    HCT 30.0 07/11/2016 06:41 AM    HCT 33.2 07/10/2016 06:09 AM    HCT 29.9 07/09/2016 03:17 AM    HCT 36.7 07/08/2016 11:26 AM    HCT 35.9 05/06/2016 08:05 AM    HCT 36.4 01/06/2016 06:09 PM    HCT 40.6 11/17/2015 06:47 AM    HCT 38.1 11/08/2015 05:33 AM    HCT 35.2 11/03/2015 03:28 PM    HCT 33.0 07/29/2015 05:16 PM    HCT 30.4 02/07/2015 08:30 PM    HCT 35.7 08/21/2014 06:30 PM    HCT 35.1 06/12/2014 10:10 AM    HCT 40.0 02/11/2014 05:55 PM    HCT 35.9 11/16/2013 01:45 AM    HCT 32.8 11/15/2011 07:25 AM    HCT 37.6 01/01/2010 11:20 PM    HCT 35.2 09/21/2009 04:55 PM    PLATELET 879 00/28/1551 07:55 AM    PLATELET 878 97/36/2914 08:04 PM    PLATELET 419 57/54/9660 06:41 AM    PLATELET 051 65/16/7917 06:09 AM    PLATELET 365 85/11/0336 03:17 AM    PLATELET 222 48/74/8321 11:26 AM    PLATELET 036 37/10/5874 08:05 AM    PLATELET 894 16/48/7131 06:09 PM    PLATELET 091 28/60/1980 06:47 AM    PLATELET 513 45/85/5430 05:33 AM    PLATELET 579 57/76/3010 03:28 PM    PLATELET 420 05/56/8073 05:16 PM    PLATELET 110 12/84/8940 08:30 PM    PLATELET 532 29/99/3597 06:30 PM    PLATELET 449 97/46/1504 10:10 AM    PLATELET 913 11/37/8858 05:55 PM    PLATELET 813 11/04/3049 01:45 AM    PLATELET 993 72/85/6269 07:25 AM    PLATELET 848 31/95/6122 11:20 PM    PLATELET 961 77/40/4072 04:55 PM    Hgb, External negative 06/04/2015       Recent Results (from the past 24 hour(s))   METABOLIC PANEL, BASIC Collection Time: 02/14/17  3:28 AM   Result Value Ref Range    Sodium 141 136 - 145 mmol/L    Potassium 3.8 3.5 - 5.1 mmol/L    Chloride 110 (H) 97 - 108 mmol/L    CO2 22 21 - 32 mmol/L    Anion gap 9 5 - 15 mmol/L    Glucose 86 65 - 100 mg/dL    BUN 11 6 - 20 MG/DL    Creatinine 0.73 0.55 - 1.02 MG/DL    BUN/Creatinine ratio 15 12 - 20      GFR est AA >60 >60 ml/min/1.73m2    GFR est non-AA >60 >60 ml/min/1.73m2    Calcium 7.9 (L) 8.5 - 10.1 MG/DL

## 2017-02-14 NOTE — ADT AUTH CERT NOTES
Utilization Review           birth by Mejia Rock        Review Status Review Entered       In Primary 2017       Details            Delivery Summary - Baby     Delivery Date: 2017   Delivery Time: 12:13 PM   Delivery Type: Vaginal, Spontaneous Delivery  Sex: female   Gestational Age: 38w11d      Measurements:  Birth Weight: 3.1 kg    Birth Length: 20\"          Delivery Clinician: Julia  Living?:   Delivery Location: L&D           APGARS One minute Five minutes Ten minutes   Skin Color: 0    1       Heart Rate: 2   2         Reflex Irritability: 2   2         Muscle Tone: 2   2       Respiration: 2   2         Total: 8   9

## 2017-02-14 NOTE — ROUTINE PROCESS
Bedside shift change report given to JEANA Hutchins RN by YVONNE Castano RN . Report given with SBAR.

## 2017-02-15 VITALS
TEMPERATURE: 98.3 F | HEART RATE: 59 BPM | SYSTOLIC BLOOD PRESSURE: 126 MMHG | DIASTOLIC BLOOD PRESSURE: 68 MMHG | WEIGHT: 190 LBS | OXYGEN SATURATION: 97 % | HEIGHT: 66 IN | RESPIRATION RATE: 16 BRPM | BODY MASS INDEX: 30.53 KG/M2

## 2017-02-15 LAB
ANION GAP BLD CALC-SCNC: 8 MMOL/L (ref 5–15)
BUN SERPL-MCNC: 10 MG/DL (ref 6–20)
BUN/CREAT SERPL: 12 (ref 12–20)
CALCIUM SERPL-MCNC: 8 MG/DL (ref 8.5–10.1)
CHLORIDE SERPL-SCNC: 107 MMOL/L (ref 97–108)
CO2 SERPL-SCNC: 25 MMOL/L (ref 21–32)
CREAT SERPL-MCNC: 0.83 MG/DL (ref 0.55–1.02)
GLUCOSE SERPL-MCNC: 68 MG/DL (ref 65–100)
POTASSIUM SERPL-SCNC: 4 MMOL/L (ref 3.5–5.1)
SODIUM SERPL-SCNC: 140 MMOL/L (ref 136–145)

## 2017-02-15 PROCEDURE — 74011250637 HC RX REV CODE- 250/637: Performed by: OBSTETRICS & GYNECOLOGY

## 2017-02-15 PROCEDURE — 80048 BASIC METABOLIC PNL TOTAL CA: CPT | Performed by: OBSTETRICS & GYNECOLOGY

## 2017-02-15 PROCEDURE — 36415 COLL VENOUS BLD VENIPUNCTURE: CPT | Performed by: OBSTETRICS & GYNECOLOGY

## 2017-02-15 RX ORDER — OXYCODONE AND ACETAMINOPHEN 5; 325 MG/1; MG/1
1-2 TABLET ORAL
Qty: 30 TAB | Refills: 0 | Status: SHIPPED | OUTPATIENT
Start: 2017-02-15 | End: 2017-05-06 | Stop reason: ALTCHOICE

## 2017-02-15 RX ORDER — IBUPROFEN 600 MG/1
600 TABLET ORAL
Qty: 30 TAB | Refills: 0 | Status: SHIPPED | OUTPATIENT
Start: 2017-02-15 | End: 2017-06-29

## 2017-02-15 RX ORDER — AMOXICILLIN 500 MG/1
500 CAPSULE ORAL EVERY 8 HOURS
Qty: 21 CAP | Refills: 0 | Status: SHIPPED | OUTPATIENT
Start: 2017-02-15 | End: 2017-02-22

## 2017-02-15 RX ADMIN — AMOXICILLIN 500 MG: 250 CAPSULE ORAL at 02:08

## 2017-02-15 RX ADMIN — OXYCODONE HYDROCHLORIDE 5 MG: 5 TABLET ORAL at 08:30

## 2017-02-15 RX ADMIN — IBUPROFEN 800 MG: 400 TABLET ORAL at 09:30

## 2017-02-15 RX ADMIN — AMOXICILLIN 500 MG: 250 CAPSULE ORAL at 09:30

## 2017-02-15 NOTE — PROGRESS NOTES
Post-Partum Day Number 2 Progress Note    Pancho Hanson     Assessment: Doing well, post partum day 2    Plan:   1. Discharge home today  2. Follow up in office in 6 weeks with Cristo Beltre MD  3. Post partum activity advised, diet as tolerated  4. Discharge Medications: ibuprofen, percocet and medications prior to admission  5. Right ureteral stent- follow up as scheduled with [facility]  6. Enterococcus UTI- home with Amoxicillin to complete 10 day course    Information for the patient's :  Eun Oreilly [[id number]]   Vaginal, Spontaneous Delivery   Patient doing well without significant complaint. Voiding without difficulty, normal lochia. Vitals:  Visit Vitals    /68 (BP 1 Location: Right arm, BP Patient Position: At rest)    Pulse (!) 59    Temp 98.3 °F (36.8 °C)    Resp 16    Ht 5' 6\" (1.676 m)    Wt 86.2 kg (190 lb)    LMP 2016    SpO2 97%    Breastfeeding Yes    BMI 30.67 kg/m2     Temp (24hrs), Av.1 °F (36.7 °C), Min:97.5 °F (36.4 °C), Max:98.7 °F (37.1 °C)      Exam:         Patient without distress. Abdomen soft, fundus firm, nontender                 Lower extremities are negative for swelling, cords or tenderness.     Labs:     Lab Results   Component Value Date/Time    WBC 13.4 2017 07:55 AM    WBC 9.2 10/30/2016 08:04 PM    WBC 8.0 2016 06:41 AM    WBC 13.7 07/10/2016 06:09 AM    WBC 14.5 2016 03:17 AM    WBC 19.0 2016 11:26 AM    WBC 13.2 2016 08:05 AM    WBC 8.7 2016 06:09 PM    WBC 5.6 2015 06:47 AM    WBC 12.1 2015 05:33 AM    WBC 7.2 2015 03:28 PM    WBC 16.3 2015 05:16 PM    WBC 9.7 2015 08:30 PM    WBC 8.7 2014 06:30 PM    WBC 5.8 2014 10:10 AM    WBC 10.3 2014 05:55 PM    WBC 15.5 2013 01:45 AM    WBC 10.8 11/15/2011 07:25 AM    WBC 9.8 2010 11:20 PM    WBC 5.7 2009 04:55 PM    HGB 12.0 2017 07:55 AM    HGB 10.7 10/30/2016 08:04 PM    HGB 10.3 07/11/2016 06:41 AM    HGB 11.3 07/10/2016 06:09 AM    HGB 10.0 07/09/2016 03:17 AM    HGB 12.5 07/08/2016 11:26 AM    HGB 12.1 05/06/2016 08:05 AM    HGB 12.5 01/06/2016 06:09 PM    HGB 13.4 11/17/2015 06:47 AM    HGB 12.3 11/08/2015 05:33 AM    HGB 11.6 11/03/2015 03:28 PM    HGB 11.1 07/29/2015 05:16 PM    HGB 10.1 02/07/2015 08:30 PM    HGB 11.8 08/21/2014 06:30 PM    HGB 11.7 06/12/2014 10:10 AM    HGB 13.3 02/11/2014 05:55 PM    HGB 12.0 11/16/2013 01:45 AM    HGB 11.1 11/15/2011 07:25 AM    HGB 13.1 01/01/2010 11:20 PM    HGB 12.2 09/21/2009 04:55 PM    HCT 36.3 02/13/2017 07:55 AM    HCT 31.9 10/30/2016 08:04 PM    HCT 30.0 07/11/2016 06:41 AM    HCT 33.2 07/10/2016 06:09 AM    HCT 29.9 07/09/2016 03:17 AM    HCT 36.7 07/08/2016 11:26 AM    HCT 35.9 05/06/2016 08:05 AM    HCT 36.4 01/06/2016 06:09 PM    HCT 40.6 11/17/2015 06:47 AM    HCT 38.1 11/08/2015 05:33 AM    HCT 35.2 11/03/2015 03:28 PM    HCT 33.0 07/29/2015 05:16 PM    HCT 30.4 02/07/2015 08:30 PM    HCT 35.7 08/21/2014 06:30 PM    HCT 35.1 06/12/2014 10:10 AM    HCT 40.0 02/11/2014 05:55 PM    HCT 35.9 11/16/2013 01:45 AM    HCT 32.8 11/15/2011 07:25 AM    HCT 37.6 01/01/2010 11:20 PM    HCT 35.2 09/21/2009 04:55 PM    PLATELET 422 82/54/0190 07:55 AM    PLATELET 510 51/83/4039 08:04 PM    PLATELET 240 89/78/6766 06:41 AM    PLATELET 250 32/27/6102 06:09 AM    PLATELET 001 83/40/8394 03:17 AM    PLATELET 267 91/89/1630 11:26 AM    PLATELET 247 10/51/0758 08:05 AM    PLATELET 500 69/88/3521 06:09 PM    PLATELET 921 46/40/4276 06:47 AM    PLATELET 435 62/54/9620 05:33 AM    PLATELET 460 31/46/9352 03:28 PM    PLATELET 775 28/62/3275 05:16 PM    PLATELET 207 18/23/0685 08:30 PM    PLATELET 086 11/52/9643 06:30 PM    PLATELET 634 03/88/6648 10:10 AM    PLATELET 878 35/26/2105 05:55 PM    PLATELET 245 46/84/3481 01:45 AM    PLATELET 230 30/42/1460 07:25 AM    PLATELET 007 18/16/8993 11:20 PM    PLATELET 184 99/35/1465 04:55 PM Hgb, External negative 06/04/2015       Recent Results (from the past 24 hour(s))   METABOLIC PANEL, BASIC    Collection Time: 02/15/17  7:54 AM   Result Value Ref Range    Sodium 140 136 - 145 mmol/L    Potassium 4.0 3.5 - 5.1 mmol/L    Chloride 107 97 - 108 mmol/L    CO2 25 21 - 32 mmol/L    Anion gap 8 5 - 15 mmol/L    Glucose 68 65 - 100 mg/dL    BUN 10 6 - 20 MG/DL    Creatinine 0.83 0.55 - 1.02 MG/DL    BUN/Creatinine ratio 12 12 - 20      GFR est AA >60 >60 ml/min/1.73m2    GFR est non-AA >60 >60 ml/min/1.73m2    Calcium 8.0 (L) 8.5 - 10.1 MG/DL

## 2017-02-15 NOTE — ROUTINE PROCESS
Bedside, Verbal, Recorded and Written shift change report given to REED Fong RN (oncoming nurse) by D. Darin Seip, RN (offgoing nurse). Report included the following information SBAR, Kardex, Procedure Summary, Intake/Output, MAR and Recent Results.

## 2017-02-15 NOTE — ROUTINE PROCESS
Bedside shift change report given to JENISE Fisher (oncoming nurse) by Josy Tellez (offgoing nurse). Report included the following information SBAR.

## 2017-02-15 NOTE — DISCHARGE SUMMARY
Obstetrical Discharge Summary     Name: Maqruis Martinez MRN: [id number]  SSN: [id number]    YOB: 1990  Age: 27 y.o. Sex: female      Admit Date: 2017    Discharge Date: 2/15/2017     Admitting Physician: Mike Hartley MD     Attending Physician:  Aurora Miramontes MD     Admission Diagnoses: Induction  Pregnancy    Discharge Diagnoses:   Information for the patient's :  Ashley Casarez [[id number]]   Delivery of a 3.1 kg female infant via Vaginal, Spontaneous Delivery on 2017 at 12:13 PM  by . Apgars were 8 and 9. Additional Diagnoses:   Hospital Problems  Date Reviewed: 2017          Codes Class Noted POA    Pregnancy ICD-10-CM: Z33.1  ICD-9-CM: V22.2  2017 Unknown             Lab Results   Component Value Date/Time    Rubella, External immune 10/13/2016    GrBStrep, External negative 2017       Hospital Course: Normal hospital course following the delivery. Disposition at Discharge: Home or self care    Discharged Condition: Stable    Patient Instructions:   Current Discharge Medication List      START taking these medications    Details   amoxicillin (AMOXIL) 500 mg capsule Take 1 Cap by mouth every eight (8) hours for 7 days. Qty: 21 Cap, Refills: 0      ibuprofen (MOTRIN) 600 mg tablet Take 1 Tab by mouth every six (6) hours as needed for Pain for up to 360 days. Qty: 30 Tab, Refills: 0      oxyCODONE-acetaminophen (PERCOCET) 5-325 mg per tablet Take 1-2 Tabs by mouth every four (4) hours as needed for Pain. Max Daily Amount: 12 Tabs. Qty: 30 Tab, Refills: 0         CONTINUE these medications which have NOT CHANGED    Details   PNV no.24-iron-folic acid-dha (PRENATAL DHA+COMPLETE PRENATAL) -300 mg-mcg-mg cmpk Take  by mouth. cephALEXin (KEFLEX) 500 mg capsule Take 500 mg by mouth two (2) times a day. Reference my discharge instructions.     Follow-up Appointments   Procedures    FOLLOW UP VISIT Appointment in: 6 Weeks With Dr. Harjinder Sifuentes for postpartum check and with urologicst at [facility] as scheduled     With Dr. Harjinder Sifuentes for postpartum check and with urologicst at [facility] as scheduled     Standing Status:   Standing     Number of Occurrences:   1     Order Specific Question:   Appointment in     Answer:   6 Weeks        Signed By:  Dafne Riggs MD     February 15, 2017

## 2017-02-15 NOTE — DISCHARGE INSTRUCTIONS
POST DELIVERY DISCHARGE INSTRUCTIONS    Name: Giovani Fontenot  YOB: 1990  Primary Diagnosis: Active Problems:    Pregnancy (2017)        General:     Diet/Diet Restrictions:  · Eight 8-ounce glasses of fluid daily (water, juices); avoid excessive caffeine intake. · Meals/snacks as desired which are high in fiber and carbohydrates and low in fat and cholesterol. Medications:         Physical Activity / Restrictions / Safety:     · Avoid heavy lifting, no more that 8 lbs. For 2-3 weeks;   · Limit use of stairs to 2 times daily for the first week home. · No driving for one week. · Avoid intercourse 4-6 weeks, no douching or tampon use. · Check with obstetrician before starting or resuming an exercise program.      Discharge Instructions/Special Treatment/Home Care Needs:     · Continue prenatal vitamins. · Continue to use squirt bottle with warm water on your episiotomy after each bathroom use until bleeding stops. · If steri-strips applied to your incision, remove in 7-10 days. Call your doctor for the following:     · Fever over 101 degrees by mouth. · Vaginal bleeding heavier than a normal menstrual period or clots larger than a golf ball. · Red streaks or increased swelling of legs, painful red streaks on your breast.  · Painful urination, constipation and increased pain or swelling or discharge with your incision. · If you feel extremely anxious or overwhelmed. · If you have thoughts of harming yourself and/or your baby. Pain Management:     · Take Acetaminophen (Tylenol) or Ibuprofen (Advil, Motrin), as directed for pain. · Use a warm Sitz bath 3 times daily to relieve episiotomy or hemorrhoidal discomfort. · For hemorrhoidal discomfort, use Tucks and Anusol cream as needed and directed. · Heating pad to  incision as needed.      Follow-Up Care:     Appointment with MD:   Follow-up Appointments   Procedures    FOLLOW UP VISIT Appointment in: 6 Weeks With  Sudheer Win for postpartum check and with urologicst at [facility] as scheduled     With Dr. Sudheer Win for postpartum check and with urologicst at [facility] as scheduled     Standing Status:   Standing     Number of Occurrences:   1     Order Specific Question:   Appointment in     Answer:   Anny Fontanez     Telephone number: [phone]    Signed By: Art Carroll MD                                                                                                   Date: 2/15/2017 Time: 9:37 AM

## 2017-05-06 ENCOUNTER — HOSPITAL ENCOUNTER (EMERGENCY)
Age: 27
Discharge: HOME | End: 2017-05-06
Attending: EMERGENCY MEDICINE
Payer: COMMERCIAL

## 2017-05-06 VITALS
SYSTOLIC BLOOD PRESSURE: 129 MMHG | HEART RATE: 69 BPM | DIASTOLIC BLOOD PRESSURE: 75 MMHG | RESPIRATION RATE: 18 BRPM | OXYGEN SATURATION: 99 % | BODY MASS INDEX: 24.11 KG/M2 | WEIGHT: 150 LBS | HEIGHT: 66 IN | TEMPERATURE: 98.3 F

## 2017-05-06 DIAGNOSIS — J20.9 ACUTE BRONCHITIS, UNSPECIFIED ORGANISM: Primary | ICD-10-CM

## 2017-05-06 PROCEDURE — 99282 EMERGENCY DEPT VISIT SF MDM: CPT

## 2017-05-06 RX ORDER — CETIRIZINE HYDROCHLORIDE, PSEUDOEPHEDRINE HYDROCHLORIDE 5; 120 MG/1; MG/1
1 TABLET, FILM COATED, EXTENDED RELEASE ORAL 2 TIMES DAILY
Qty: 20 TAB | Refills: 0 | Status: SHIPPED | OUTPATIENT
Start: 2017-05-06 | End: 2017-05-06

## 2017-05-06 RX ORDER — CODEINE PHOSPHATE AND GUAIFENESIN 10; 100 MG/5ML; MG/5ML
5 SOLUTION ORAL
Qty: 180 ML | Refills: 0 | Status: SHIPPED | OUTPATIENT
Start: 2017-05-06 | End: 2017-06-29

## 2017-05-06 RX ORDER — AZITHROMYCIN 250 MG/1
TABLET, FILM COATED ORAL
Qty: 6 TAB | Refills: 0 | Status: SHIPPED | OUTPATIENT
Start: 2017-05-06 | End: 2017-06-29

## 2017-05-06 RX ORDER — CETIRIZINE HYDROCHLORIDE, PSEUDOEPHEDRINE HYDROCHLORIDE 5; 120 MG/1; MG/1
1 TABLET, FILM COATED, EXTENDED RELEASE ORAL 2 TIMES DAILY
Qty: 20 TAB | Refills: 0 | Status: SHIPPED | OUTPATIENT
Start: 2017-05-06 | End: 2017-06-29

## 2017-05-06 RX ORDER — AZITHROMYCIN 250 MG/1
TABLET, FILM COATED ORAL
Qty: 6 TAB | Refills: 0 | Status: SHIPPED | OUTPATIENT
Start: 2017-05-06 | End: 2017-05-06

## 2017-05-06 NOTE — ED PROVIDER NOTES
Patient is a 27 y.o. female presenting with sore throat and cough. The history is provided by the patient and medical records. No  was used. Sore Throat    This is a new problem. The current episode started more than 2 days ago. The problem has not changed since onset. There has been no fever. Associated symptoms include congestion, plugged ear sensation and cough. Pertinent negatives include no diarrhea, no vomiting, no ear discharge, no ear pain, no headaches, no shortness of breath, no stridor, no swollen glands, no trouble swallowing and no stiff neck. Treatments tried: keflex for UTI. The treatment provided no relief. Cough   This is a new problem. The current episode started more than 2 days ago. Episode frequency: nightly. The problem has not changed since onset. The cough is productive of sputum. There has been no fever. Associated symptoms include chest pain, ear congestion and sore throat. Pertinent negatives include no chills, no sweats, no weight loss, no eye redness, no ear pain, no headaches, no shortness of breath, no wheezing and no vomiting. Treatments tried: keflex. She is not a smoker. Her past medical history does not include asthma. Pt with cough mostly at night, greenish sputum, no wheeze. Sinus drainage. Taking Keflex for UTI, no improvement.     Past Medical History:   Diagnosis Date    Abnormal Pap smear     Anemia     Chronic kidney disease     Chronic pain     abdominal,lower right flank    Hydronephrosis, right 8/22/2014       Past Surgical History:   Procedure Laterality Date    HX GYN      vaginal birth x 2    HX OTHER SURGICAL  2016 August     urethral stent    HX OTHER SURGICAL      urethral stents replaced multiple times     HX UROLOGICAL      stent placements         Family History:   Problem Relation Age of Onset    Elevated Lipids Mother     Hypertension Mother     Cancer Paternal Aunt     Cancer Paternal Uncle     Arthritis-osteo Maternal Grandmother     Diabetes Maternal Grandmother     Cancer Paternal Grandmother     Diabetes Paternal Grandmother        Social History     Social History    Marital status: SINGLE     Spouse name: N/A    Number of children: N/A    Years of education: N/A     Occupational History    Not on file. Social History Main Topics    Smoking status: Former Smoker     Years: 2.00    Smokeless tobacco: Never Used    Alcohol use No      Comment: ocassionaly    Drug use: No      Comment: last used 1 week ago    Sexual activity: Yes     Partners: Male     Other Topics Concern    Not on file     Social History Narrative         ALLERGIES: Review of patient's allergies indicates no known allergies. Review of Systems   Constitutional: Negative for chills and weight loss. HENT: Positive for congestion and sore throat. Negative for ear discharge, ear pain and trouble swallowing. Eyes: Negative for redness. Respiratory: Positive for cough. Negative for shortness of breath, wheezing and stridor. Cardiovascular: Positive for chest pain. Gastrointestinal: Negative for diarrhea and vomiting. Neurological: Negative for headaches. All other systems reviewed and are negative. Vitals:    05/06/17 0736   BP: 129/75   Pulse: 69   Resp: 18   Temp: 98.3 °F (36.8 °C)   SpO2: 99%   Weight: 68 kg (150 lb)   Height: 5' 6\" (1.676 m)            Physical Exam   Constitutional: She is oriented to person, place, and time. She appears well-developed and well-nourished. No distress. Nontoxic appearing female   HENT:   Head: Normocephalic and atraumatic. Nose: Nose normal.   Mouth/Throat: Oropharynx is clear and moist. No oropharyngeal exudate. Clear fluid TM   Eyes: Conjunctivae and EOM are normal. Pupils are equal, round, and reactive to light. Right eye exhibits no discharge. Left eye exhibits no discharge. No scleral icterus. Neck: Normal range of motion. Neck supple. No JVD present.  No tracheal deviation present. No thyromegaly present. Cardiovascular: Normal rate, regular rhythm and normal heart sounds. No murmur heard. Pulmonary/Chest: Effort normal and breath sounds normal. No stridor. No respiratory distress. She has no wheezes. She has no rales. Abdominal: Soft. She exhibits no distension. There is no tenderness. Musculoskeletal: Normal range of motion. She exhibits no edema or tenderness. Lymphadenopathy:     She has no cervical adenopathy. Neurological: She is alert and oriented to person, place, and time. No cranial nerve deficit. Coordination normal.   Skin: Skin is warm and dry. No rash noted. She is not diaphoretic. No erythema. No pallor. Psychiatric: She has a normal mood and affect. Her behavior is normal.   Nursing note and vitals reviewed.        MDM  ED Course       Procedures

## 2017-05-06 NOTE — DISCHARGE INSTRUCTIONS
Bronchitis: Care Instructions  Your Care Instructions    Bronchitis is inflammation of the bronchial tubes, which carry air to the lungs. The tubes swell and produce mucus, or phlegm. The mucus and inflamed bronchial tubes make you cough. You may have trouble breathing. Most cases of bronchitis are caused by viruses like those that cause colds. Antibiotics usually do not help and they may be harmful. Bronchitis usually develops rapidly and lasts about 2 to 3 weeks in otherwise healthy people. Follow-up care is a key part of your treatment and safety. Be sure to make and go to all appointments, and call your doctor if you are having problems. It's also a good idea to know your test results and keep a list of the medicines you take. How can you care for yourself at home? · Take all medicines exactly as prescribed. Call your doctor if you think you are having a problem with your medicine. · Get some extra rest.  · Take an over-the-counter pain medicine, such as acetaminophen (Tylenol), ibuprofen (Advil, Motrin), or naproxen (Aleve) to reduce fever and relieve body aches. Read and follow all instructions on the label. · Do not take two or more pain medicines at the same time unless the doctor told you to. Many pain medicines have acetaminophen, which is Tylenol. Too much acetaminophen (Tylenol) can be harmful. · Take an over-the-counter cough medicine that contains dextromethorphan to help quiet a dry, hacking cough so that you can sleep. Avoid cough medicines that have more than one active ingredient. Read and follow all instructions on the label. · Breathe moist air from a humidifier, hot shower, or sink filled with hot water. The heat and moisture will thin mucus so you can cough it out. · Do not smoke. Smoking can make bronchitis worse. If you need help quitting, talk to your doctor about stop-smoking programs and medicines. These can increase your chances of quitting for good.   When should you call for help? Call 911 anytime you think you may need emergency care. For example, call if:  · You have severe trouble breathing. Call your doctor now or seek immediate medical care if:  · You have new or worse trouble breathing. · You cough up dark brown or bloody mucus (sputum). · You have a new or higher fever. · You have a new rash. Watch closely for changes in your health, and be sure to contact your doctor if:  · You cough more deeply or more often, especially if you notice more mucus or a change in the color of your mucus. · You are not getting better as expected. Where can you learn more? Go to http://isabel-shabbir.info/. Enter H333 in the search box to learn more about \"Bronchitis: Care Instructions. \"  Current as of: May 23, 2016  Content Version: 11.2  © 4711-6075 [employer]. Care instructions adapted under license by [employer] (which disclaims liability or warranty for this information). If you have questions about a medical condition or this instruction, always ask your healthcare professional. [address] any warranty or liability for your use of this information.

## 2017-05-06 NOTE — ED NOTES
Pt evaluated by Dr. Ackerman, Plan of care accepted by pt and pt left unit steady gait. Pt refuse W/C for D/C. Patient (s)  given copy of dc instructions and 3 script(s). Patient(s)  verbalized understanding of instructions and script (s). Patient given a current medication reconciliation form and verbalized understanding of their medications. Patient (s) verbalized understanding of the importance of discussing medications with  his or her physician or clinic when they follow up. Patient alert and oriented and in no acute distress. Pt verbalizes pain scale of 4 out of 10. Patient discharged home ambulatory with self.

## 2017-06-29 ENCOUNTER — HOSPITAL ENCOUNTER (EMERGENCY)
Age: 27
Discharge: HOME | End: 2017-06-29
Attending: EMERGENCY MEDICINE
Payer: COMMERCIAL

## 2017-06-29 VITALS
DIASTOLIC BLOOD PRESSURE: 80 MMHG | TEMPERATURE: 98.8 F | HEIGHT: 66 IN | RESPIRATION RATE: 18 BRPM | OXYGEN SATURATION: 98 % | WEIGHT: 145 LBS | SYSTOLIC BLOOD PRESSURE: 135 MMHG | HEART RATE: 103 BPM | BODY MASS INDEX: 23.3 KG/M2

## 2017-06-29 DIAGNOSIS — S61.219A LACERATION OF FINGER OF RIGHT HAND, INITIAL ENCOUNTER: Primary | ICD-10-CM

## 2017-06-29 PROCEDURE — 90471 IMMUNIZATION ADMIN: CPT

## 2017-06-29 PROCEDURE — 90715 TDAP VACCINE 7 YRS/> IM: CPT | Performed by: PHYSICIAN ASSISTANT

## 2017-06-29 PROCEDURE — 74011250636 HC RX REV CODE- 250/636: Performed by: PHYSICIAN ASSISTANT

## 2017-06-29 PROCEDURE — 99282 EMERGENCY DEPT VISIT SF MDM: CPT

## 2017-06-29 RX ORDER — CEPHALEXIN 500 MG/1
500 CAPSULE ORAL 3 TIMES DAILY
Qty: 21 CAP | Refills: 0 | Status: SHIPPED | OUTPATIENT
Start: 2017-06-29 | End: 2017-07-06

## 2017-06-29 RX ADMIN — TETANUS TOXOID, REDUCED DIPHTHERIA TOXOID AND ACELLULAR PERTUSSIS VACCINE, ADSORBED 0.5 ML: 5; 2.5; 8; 8; 2.5 SUSPENSION INTRAMUSCULAR at 11:48

## 2017-06-29 NOTE — ED NOTES
Emergency Department Nursing Plan of Care       The Nursing Plan of Care is developed from the Nursing assessment and Emergency Department Attending provider initial evaluation. The plan of care may be reviewed in the ED Provider note. The Plan of Care was developed with the following considerations:   Patient / Family readiness to learn indicated by:verbalized understanding  Persons(s) to be included in education: patient  Barriers to Learning/Limitations:No    Signed     Esme Lopez    6/29/2017   11:20 AM    See nursing assessment    Patient is alert and oriented x 4 and in no acute distress at this time. Respirations are at a regular rate, depth, and pattern. Patient updated on plan of care and has no questions or concerns at this time.

## 2017-06-29 NOTE — DISCHARGE INSTRUCTIONS
Cuts: Care Instructions  Your Care Instructions  A cut can happen anywhere on your body. Stitches, staples, skin adhesives, or pieces of tape called Steri-Strips are sometimes used to keep the edges of a cut together and help it heal. Steri-Strips can be used by themselves or with stitches or staples. Sometimes cuts are left open. If the cut went deep and through the skin, the doctor may have closed the cut in two layers. A deeper layer of stitches brings the deep part of the cut together. These stitches will dissolve and don't need to be removed. The upper layer closure, which could be stitches, staples, Steri-Strips, or adhesive, is what you see on the cut. A cut is often covered by a bandage. The doctor has checked you carefully, but problems can develop later. If you notice any problems or new symptoms, get medical treatment right away. Follow-up care is a key part of your treatment and safety. Be sure to make and go to all appointments, and call your doctor if you are having problems. It's also a good idea to know your test results and keep a list of the medicines you take. How can you care for yourself at home? If a cut is open or closed  · Prop up the sore area on a pillow anytime you sit or lie down during the next 3 days. Try to keep it above the level of your heart. This will help reduce swelling. · Keep the cut dry for the first 24 to 48 hours. After this, you can shower if your doctor okays it. Pat the cut dry. · Don't soak the cut, such as in a bathtub. Your doctor will tell you when it's safe to get the cut wet. · After the first 24 to 48 hours, clean the cut with soap and water 2 times a day unless your doctor gives you different instructions. ¨ Don't use hydrogen peroxide or alcohol, which can slow healing. ¨ You may cover the cut with a thin layer of petroleum jelly and a nonstick bandage.   ¨ If the doctor put a bandage over the cut, put on a new bandage after cleaning the cut or if the bandage gets wet or dirty. · Avoid any activity that could cause your cut to reopen. · Be safe with medicines. Read and follow all instructions on the label. ¨ If the doctor gave you a prescription medicine for pain, take it as prescribed. ¨ If you are not taking a prescription pain medicine, ask your doctor if you can take an over-the-counter medicine. If the cut is closed with stitches, staples, or Steri-Strips  · Follow the above instructions for open or closed cuts. · Do not remove the stitches or staples on your own. Your doctor will tell you when to come back to have the stitches or staples removed. · Leave Steri-Strips on until they fall off. If the cut is closed with a skin adhesive  · Follow the above instructions for open or closed cuts. · Leave the skin adhesive on your skin until it falls off on its own. This may take 5 to 10 days. · Do not scratch, rub, or pick at the adhesive. · Do not put the sticky part of a bandage directly on the adhesive. · Do not put any kind of ointment, cream, or lotion over the area. This can make the adhesive fall off too soon. Do not use hydrogen peroxide or alcohol, which can slow healing. When should you call for help? Call your doctor now or seek immediate medical care if:  · You have new pain, or your pain gets worse. · The skin near the cut is cold or pale or changes color. · You have tingling, weakness, or numbness near the cut. · The cut starts to bleed, and blood soaks through the bandage. Oozing small amounts of blood is normal.  · You have trouble moving the area near the cut. · You have symptoms of infection, such as:  ¨ Increased pain, swelling, warmth, or redness around the cut. ¨ Red streaks leading from the cut. ¨ Pus draining from the cut. ¨ A fever. Watch closely for changes in your health, and be sure to contact your doctor if:  · The cut reopens. · You do not get better as expected. Where can you learn more?   Go to http://isabel-shabbir.info/. Enter M735 in the search box to learn more about \"Cuts: Care Instructions. \"  Current as of: March 20, 2017  Content Version: 11.3  © 5336-1727 [employer]. Care instructions adapted under license by [employer] (which disclaims liability or warranty for this information). If you have questions about a medical condition or this instruction, always ask your healthcare professional. [place] [address] any warranty or liability for your use of this information.

## 2017-06-29 NOTE — ED TRIAGE NOTES
Right 4th finger laceration while working on an old car yesterday (believe that it was old metal), last tetanus 15+ years or so ago, no active drainage now

## 2017-06-29 NOTE — ED PROVIDER NOTES
Patient is a 27 y.o. female presenting with skin laceration. The history is provided by the patient. Laceration    Incident onset: Pt reports cutting hand while helping a friend fix a car about 15 hours ago. Pt reports not UTD on tetanus. Pain location: right fourth finger. The laceration is 1 cm in size. Injury mechanism: rusted car parts. Foreign body present: no. The patient is experiencing no pain. Pertinent negatives include no numbness, no tingling, no weakness, no loss of motion, no coolness and no discoloration. The patient's last tetanus shot was more than 10 years ago. Past Medical History:   Diagnosis Date    Abnormal Pap smear     Anemia     Chronic kidney disease     Chronic pain     abdominal,lower right flank    Hydronephrosis, right 8/22/2014       Past Surgical History:   Procedure Laterality Date    HX GYN      vaginal birth x 2    HX OTHER SURGICAL  2016 August     urethral stent    HX OTHER SURGICAL      urethral stents replaced multiple times     HX UROLOGICAL      stent placements         Family History:   Problem Relation Age of Onset    Elevated Lipids Mother     Hypertension Mother     Cancer Paternal Aunt     Cancer Paternal Uncle     Arthritis-osteo Maternal Grandmother     Diabetes Maternal Grandmother     Cancer Paternal Grandmother     Diabetes Paternal Grandmother        Social History     Social History    Marital status: SINGLE     Spouse name: N/A    Number of children: N/A    Years of education: N/A     Occupational History    Not on file. Social History Main Topics    Smoking status: Former Smoker     Years: 2.00    Smokeless tobacco: Never Used    Alcohol use No      Comment: ocassionaly    Drug use: No      Comment: last used 1 week ago    Sexual activity: Yes     Partners: Male     Other Topics Concern    Not on file     Social History Narrative         ALLERGIES: Review of patient's allergies indicates no known allergies.     Review of Systems   Constitutional: Negative for chills and fever. Eyes: Negative for photophobia and visual disturbance. Respiratory: Negative for chest tightness and shortness of breath. Cardiovascular: Negative for chest pain. Gastrointestinal: Negative for abdominal pain, nausea and vomiting. Genitourinary: Negative for flank pain. Musculoskeletal: Negative for back pain and myalgias. Skin: Positive for wound. Negative for color change, pallor and rash. Neurological: Negative for dizziness, tingling, weakness, light-headedness and numbness. All other systems reviewed and are negative. Vitals:    06/29/17 1115   BP: 135/80   Pulse: (!) 103   Resp: 18   Temp: 98.8 °F (37.1 °C)   SpO2: 98%   Weight: 65.8 kg (145 lb)   Height: 5' 6\" (1.676 m)            Physical Exam   Constitutional: She is oriented to person, place, and time. She appears well-developed and well-nourished. No distress. HENT:   Head: Normocephalic and atraumatic. Eyes: Conjunctivae are normal.   Cardiovascular: Normal rate, regular rhythm and normal heart sounds. Pulmonary/Chest: Effort normal and breath sounds normal. No respiratory distress. Abdominal: Soft. Bowel sounds are normal. She exhibits no distension. Musculoskeletal: Normal range of motion. Neurological: She is alert and oriented to person, place, and time. Skin: Skin is warm. No rash noted. 1 cm superficial laceration between the PIP and DIP of right fourth finger. No surrounding swelling, drainage, erythema, FB. Psychiatric: She has a normal mood and affect. Her behavior is normal.   Nursing note and vitals reviewed. MDM  Number of Diagnoses or Management Options  Laceration of finger of right hand, initial encounter:   Diagnosis management comments: DDx: Laceration, Cellulitis, Tetanus, FB in wound    ED Course       Procedures         Wound cleaned and dressed with band-aid.  Discussed with pt that wound is superficial and does not require sutures. Pt voiced she understood and agree. LABORATORY TESTS:  No results found for this or any previous visit (from the past 12 hour(s)). IMAGING RESULTS:  No orders to display       MEDICATIONS GIVEN:  Medications   diph,Pertuss(AC),Tet Vac-PF (BOOSTRIX) suspension 0.5 mL (0.5 mL IntraMUSCular Given 6/29/17 1148)       IMPRESSION:  1. Laceration of finger of right hand, initial encounter        PLAN:  1. Discharge Medication List as of 6/29/2017 12:03 PM      START taking these medications    Details   cephALEXin (KEFLEX) 500 mg capsule Take 1 Cap by mouth three (3) times daily for 7 days. , Normal, Disp-21 Cap, R-0           2.    Follow-up Information     Follow up With Details Comments Contact Info    [facility] Schedule an appointment as soon as possible for a visit in 2 days for PCP follow up [address]  [phone]        Return to ED if worse

## 2017-07-08 ENCOUNTER — HOSPITAL ENCOUNTER (EMERGENCY)
Age: 27
Discharge: HOME | End: 2017-07-08
Attending: EMERGENCY MEDICINE
Payer: COMMERCIAL

## 2017-07-08 VITALS
SYSTOLIC BLOOD PRESSURE: 139 MMHG | TEMPERATURE: 98.4 F | DIASTOLIC BLOOD PRESSURE: 77 MMHG | RESPIRATION RATE: 16 BRPM | OXYGEN SATURATION: 98 % | HEIGHT: 67 IN | HEART RATE: 75 BPM | WEIGHT: 150 LBS | BODY MASS INDEX: 23.54 KG/M2

## 2017-07-08 DIAGNOSIS — Z20.2 POSSIBLE EXPOSURE TO STD: ICD-10-CM

## 2017-07-08 DIAGNOSIS — N30.01 ACUTE CYSTITIS WITH HEMATURIA: Primary | ICD-10-CM

## 2017-07-08 LAB
APPEARANCE UR: ABNORMAL
BACTERIA URNS QL MICRO: ABNORMAL /HPF
BILIRUB UR QL: NEGATIVE
CLUE CELLS VAG QL WET PREP: NORMAL
COLOR UR: ABNORMAL
EPITH CASTS URNS QL MICRO: ABNORMAL /LPF
GLUCOSE UR STRIP.AUTO-MCNC: NEGATIVE MG/DL
HCG UR QL: NEGATIVE
HGB UR QL STRIP: ABNORMAL
KETONES UR QL STRIP.AUTO: NEGATIVE MG/DL
KOH PREP SPEC: NORMAL
LEUKOCYTE ESTERASE UR QL STRIP.AUTO: ABNORMAL
NITRITE UR QL STRIP.AUTO: POSITIVE
PH UR STRIP: 6.5 [PH] (ref 5–8)
PROT UR STRIP-MCNC: 100 MG/DL
RBC #/AREA URNS HPF: ABNORMAL /HPF (ref 0–5)
SERVICE CMNT-IMP: NORMAL
SP GR UR REFRACTOMETRY: 1.02 (ref 1–1.03)
T VAGINALIS VAG QL WET PREP: NORMAL
UA: UC IF INDICATED,UAUC: ABNORMAL
UROBILINOGEN UR QL STRIP.AUTO: 0.2 EU/DL (ref 0.2–1)
WBC URNS QL MICRO: ABNORMAL /HPF (ref 0–4)

## 2017-07-08 PROCEDURE — 87086 URINE CULTURE/COLONY COUNT: CPT | Performed by: EMERGENCY MEDICINE

## 2017-07-08 PROCEDURE — 87186 SC STD MICRODIL/AGAR DIL: CPT | Performed by: EMERGENCY MEDICINE

## 2017-07-08 PROCEDURE — 87491 CHLMYD TRACH DNA AMP PROBE: CPT | Performed by: EMERGENCY MEDICINE

## 2017-07-08 PROCEDURE — 87210 SMEAR WET MOUNT SALINE/INK: CPT | Performed by: EMERGENCY MEDICINE

## 2017-07-08 PROCEDURE — 74011000250 HC RX REV CODE- 250: Performed by: EMERGENCY MEDICINE

## 2017-07-08 PROCEDURE — 96372 THER/PROPH/DIAG INJ SC/IM: CPT

## 2017-07-08 PROCEDURE — 81025 URINE PREGNANCY TEST: CPT

## 2017-07-08 PROCEDURE — 81001 URINALYSIS AUTO W/SCOPE: CPT | Performed by: EMERGENCY MEDICINE

## 2017-07-08 PROCEDURE — 74011250637 HC RX REV CODE- 250/637: Performed by: EMERGENCY MEDICINE

## 2017-07-08 PROCEDURE — 87077 CULTURE AEROBIC IDENTIFY: CPT | Performed by: EMERGENCY MEDICINE

## 2017-07-08 PROCEDURE — 74011250636 HC RX REV CODE- 250/636: Performed by: EMERGENCY MEDICINE

## 2017-07-08 PROCEDURE — 99284 EMERGENCY DEPT VISIT MOD MDM: CPT

## 2017-07-08 RX ORDER — AZITHROMYCIN 250 MG/1
1000 TABLET, FILM COATED ORAL
Status: COMPLETED | OUTPATIENT
Start: 2017-07-08 | End: 2017-07-08

## 2017-07-08 RX ORDER — CEPHALEXIN 500 MG/1
500 CAPSULE ORAL 2 TIMES DAILY
Qty: 14 CAP | Refills: 0 | Status: SHIPPED | OUTPATIENT
Start: 2017-07-08 | End: 2017-07-15

## 2017-07-08 RX ADMIN — AZITHROMYCIN 1000 MG: 250 TABLET, FILM COATED ORAL at 09:45

## 2017-07-08 RX ADMIN — LIDOCAINE HYDROCHLORIDE 250 MG: 10 INJECTION, SOLUTION EPIDURAL; INFILTRATION; INTRACAUDAL; PERINEURAL at 09:45

## 2017-07-08 NOTE — ED NOTES
..  Emergency Department Nursing Plan of Care       The Nursing Plan of Care is developed from the Nursing assessment and Emergency Department Attending provider initial evaluation. The plan of care may be reviewed in the ED Provider note. The Plan of Care was developed with the following considerations:   Patient / Family readiness to learn indicated by:verbalized understanding and appropriate questions asked  Persons(s) to be included in education: patient  Barriers to Learning/Limitations:No    Signed     Edgardo Bazan RN    7/8/2017   9:07 AM    .. Patient verbalized name and date of birth. Name and date of birth compared to chart to validate information. Patient verbalized that his/her armband contains the correct spelling of name and date of birth.

## 2017-07-08 NOTE — ED PROVIDER NOTES
Patient is a 27 y.o. female presenting with vaginal discharge. The history is provided by the patient. No  was used. Vaginal Discharge    This is a new problem. The current episode started more than 2 days ago. The problem occurs constantly. The problem has not changed since onset. The discharge occurs spontaneously. The discharge was white. Associated symptoms include dysuria. Pertinent negatives include no fever, no abdominal pain, no nausea, no vomiting, no frequency, no genital burning, no genital itching, no perineal pain and no perineal odor. Past Medical History:   Diagnosis Date    Abnormal Pap smear     Anemia     Chronic kidney disease     Chronic pain     abdominal,lower right flank    Hydronephrosis, right 8/22/2014       Past Surgical History:   Procedure Laterality Date    HX GYN      vaginal birth x 2    HX OTHER SURGICAL  2016 August     urethral stent    HX OTHER SURGICAL  03/2017    urethral stents replaced multiple times, last placed right urethal stent in march 2017    HX UROLOGICAL      stent placements         Family History:   Problem Relation Age of Onset    Elevated Lipids Mother     Hypertension Mother     Cancer Paternal Aunt     Cancer Paternal Uncle     Arthritis-osteo Maternal Grandmother     Diabetes Maternal Grandmother     Cancer Paternal Grandmother     Diabetes Paternal Grandmother        Social History     Social History    Marital status: SINGLE     Spouse name: N/A    Number of children: N/A    Years of education: N/A     Occupational History    Not on file.      Social History Main Topics    Smoking status: Former Smoker     Years: 2.00    Smokeless tobacco: Never Used    Alcohol use 3.6 oz/week     5 Glasses of wine, 1 Standard drinks or equivalent per week      Comment: ocassionally    Drug use: No      Comment: last used 1 week ago    Sexual activity: Yes     Partners: Male     Birth control/ protection: None     Other Topics Concern    Not on file     Social History Narrative         ALLERGIES: Review of patient's allergies indicates no known allergies. Review of Systems   Constitutional: Negative for fever. Gastrointestinal: Negative for abdominal pain, nausea and vomiting. Genitourinary: Positive for dysuria and vaginal discharge. Negative for frequency. All other systems reviewed and are negative. Vitals:    07/08/17 0834   BP: 139/77   Pulse: 73   Resp: 18   Temp: 98.4 °F (36.9 °C)   SpO2: 99%   Weight: 68 kg (150 lb)   Height: 5' 7\" (1.702 m)            Physical Exam   Constitutional: She is oriented to person, place, and time. She appears well-developed and well-nourished. No distress. Pleasant  female in NAD   HENT:   Head: Normocephalic and atraumatic. Nose: Nose normal.   Mouth/Throat: Oropharynx is clear and moist.   Eyes: Conjunctivae and EOM are normal. Pupils are equal, round, and reactive to light. Right eye exhibits no discharge. Left eye exhibits no discharge. No scleral icterus. Neck: Normal range of motion. Neck supple. Cardiovascular: Normal rate, regular rhythm and normal heart sounds. Pulmonary/Chest: Effort normal. No respiratory distress. Abdominal: Soft. She exhibits no distension. There is no tenderness. There is no rebound and no guarding. Genitourinary: Uterus normal. Vaginal discharge found. Genitourinary Comments: Thin white discharge, no CMT/lesion   Musculoskeletal: Normal range of motion. She exhibits no edema or tenderness. Neurological: She is alert and oriented to person, place, and time. Skin: Skin is warm and dry. She is not diaphoretic. Psychiatric: She has a normal mood and affect. Her behavior is normal.   Nursing note and vitals reviewed.        MDM  ED Course       Procedures

## 2017-07-08 NOTE — ED NOTES
Pt given printed discharge instructions and 1 script(s). Pt verbalized understanding of instructions and script(s). Pt verbalized importance of following up with PCP. Pt alert and oriented, in no acute distress, ambulatory with self. Patient offered wheelchair from treatment area to hospital entrance, patient declined wheelchair. Pt declined work note.

## 2017-07-08 NOTE — DISCHARGE INSTRUCTIONS
Urinary Tract Infection in Women: Care Instructions  Your Care Instructions    A urinary tract infection, or UTI, is a general term for an infection anywhere between the kidneys and the urethra (where urine comes out). Most UTIs are bladder infections. They often cause pain or burning when you urinate. UTIs are caused by bacteria and can be cured with antibiotics. Be sure to complete your treatment so that the infection goes away. Follow-up care is a key part of your treatment and safety. Be sure to make and go to all appointments, and call your doctor if you are having problems. It's also a good idea to know your test results and keep a list of the medicines you take. How can you care for yourself at home? · Take your antibiotics as directed. Do not stop taking them just because you feel better. You need to take the full course of antibiotics. · Drink extra water and other fluids for the next day or two. This may help wash out the bacteria that are causing the infection. (If you have kidney, heart, or liver disease and have to limit fluids, talk with your doctor before you increase your fluid intake.)  · Avoid drinks that are carbonated or have caffeine. They can irritate the bladder. · Urinate often. Try to empty your bladder each time. · To relieve pain, take a hot bath or lay a heating pad set on low over your lower belly or genital area. Never go to sleep with a heating pad in place. To prevent UTIs  · Drink plenty of water each day. This helps you urinate often, which clears bacteria from your system. (If you have kidney, heart, or liver disease and have to limit fluids, talk with your doctor before you increase your fluid intake.)  · Urinate when you need to. · Urinate right after you have sex. · Change sanitary pads often. · Avoid douches, bubble baths, feminine hygiene sprays, and other feminine hygiene products that have deodorants.   · After going to the bathroom, wipe from front to back.  When should you call for help? Call your doctor now or seek immediate medical care if:  · Symptoms such as fever, chills, nausea, or vomiting get worse or appear for the first time. · You have new pain in your back just below your rib cage. This is called flank pain. · There is new blood or pus in your urine. · You have any problems with your antibiotic medicine. Watch closely for changes in your health, and be sure to contact your doctor if:  · You are not getting better after taking an antibiotic for 2 days. · Your symptoms go away but then come back. Where can you learn more? Go to http://isabel-shabbir.info/. Enter C236 in the search box to learn more about \"Urinary Tract Infection in Women: Care Instructions. \"  Current as of: November 28, 2016  Content Version: 11.3  © 6196-2736 [employer]. Care instructions adapted under license by [employer] (which disclaims liability or warranty for this information). If you have questions about a medical condition or this instruction, always ask your healthcare professional. [address] any warranty or liability for your use of this information. Exposure to Sexually Transmitted Infections: Care Instructions  Your Care Instructions  Sexually transmitted infections (STIs) are those diseases spread by sexual contact. There are at least 20 different STIs, including chlamydia, gonorrhea, syphilis, and human immunodeficiency virus (HIV), which causes AIDS. Bacteria-caused STIs can be treated and cured. STIs caused by viruses, such as HIV, can be treated but not cured. Some STIs can reduce a woman's chances of getting pregnant in the future. STIs are spread during sexual contact, such as vaginal intercourse and oral or anal sex. Follow-up care is a key part of your treatment and safety. Be sure to make and go to all appointments, and call your doctor if you are having problems.  Its also a good idea to know your test results and keep a list of the medicines you take. How can you care for yourself at home? · Your doctor may have given you a shot of antibiotics. If your doctor prescribed antibiotic pills, take them as directed. Do not stop taking them just because you feel better. You need to take the full course of antibiotics. · Do not have sexual contact while you have symptoms of an STI or are being treated for an STI. · Tell your sex partner (or partners) that he or she will need treatment. · If you are a woman, do not douche. Douching changes the normal balance of bacteria in the vagina and may spread an infection up into your reproductive organs. To prevent exposure to STIs in the future  · Use latex condoms every time you have sex. Use them from the beginning to the end of sexual contact. · Talk to your partner before you have sex. Find out if he or she has or is at risk for any STI. Keep in mind that a person may be able to spread an STI even if he or she does not have symptoms. · Do not have sex if you are being treated for an STI. · Do not have sex with anyone who has symptoms of an STI, such as sores on the genitals or mouth. · Having one sex partner (who does not have STIs and does not have sex with anyone else) is a good way to avoid STIs. When should you call for help? Call your doctor now or seek immediate medical care if:  · You have new pain in your belly or pelvis. · You have symptoms of a urinary tract infection. These may include:  ¨ Pain or burning when you urinate. ¨ A frequent need to urinate without being able to pass much urine. ¨ Pain in the flank, which is just below the rib cage and above the waist on either side of the back. ¨ Blood in your urine. ¨ A fever. · You have new or worsening pain or swelling in the scrotum. Watch closely for changes in your health, and be sure to contact your doctor if:  · You have unusual vaginal bleeding.   · You have a discharge from the vagina or penis. · You have any new symptoms, such as sores, bumps, rashes, blisters, or warts. · You have itching, tingling, pain, or burning in the genital or anal area. · You think you may have an STI. Where can you learn more? Go to http://isabel-shabbir.info/. Enter R305 in the search box to learn more about \"Exposure to Sexually Transmitted Infections: Care Instructions. \"  Current as of: March 20, 2017  Content Version: 11.3  © 9122-2413 [employer]. Care instructions adapted under license by [employer] (which disclaims liability or warranty for this information). If you have questions about a medical condition or this instruction, always ask your healthcare professional. [address] any warranty or liability for your use of this information.

## 2017-07-10 LAB
BACTERIA SPEC CULT: ABNORMAL
BACTERIA SPEC CULT: ABNORMAL
C TRACH DNA SPEC QL NAA+PROBE: NEGATIVE
CC UR VC: ABNORMAL
N GONORRHOEA DNA SPEC QL NAA+PROBE: NEGATIVE
SAMPLE TYPE: NORMAL
SERVICE CMNT-IMP: ABNORMAL
SERVICE CMNT-IMP: NORMAL
SPECIMEN SOURCE: NORMAL

## 2017-10-27 ENCOUNTER — HOSPITAL ENCOUNTER (EMERGENCY)
Age: 27
Discharge: LEFT BEFORE DISCHARGE | End: 2017-10-27
Attending: EMERGENCY MEDICINE
Payer: COMMERCIAL

## 2017-10-27 VITALS
TEMPERATURE: 98.2 F | HEIGHT: 66 IN | SYSTOLIC BLOOD PRESSURE: 152 MMHG | BODY MASS INDEX: 27.8 KG/M2 | HEART RATE: 107 BPM | WEIGHT: 173 LBS | RESPIRATION RATE: 18 BRPM | DIASTOLIC BLOOD PRESSURE: 89 MMHG | OXYGEN SATURATION: 100 %

## 2017-10-27 DIAGNOSIS — N93.9 VAGINAL BLEEDING: Primary | ICD-10-CM

## 2017-10-27 LAB
APPEARANCE UR: ABNORMAL
BACTERIA URNS QL MICRO: ABNORMAL /HPF
BILIRUB UR QL: NEGATIVE
COLOR UR: ABNORMAL
EPITH CASTS URNS QL MICRO: ABNORMAL /LPF
GLUCOSE UR STRIP.AUTO-MCNC: NEGATIVE MG/DL
HCG UR QL: POSITIVE
HGB UR QL STRIP: ABNORMAL
KETONES UR QL STRIP.AUTO: NEGATIVE MG/DL
LEUKOCYTE ESTERASE UR QL STRIP.AUTO: ABNORMAL
NITRITE UR QL STRIP.AUTO: NEGATIVE
PH UR STRIP: 8 [PH] (ref 5–8)
PROT UR STRIP-MCNC: 30 MG/DL
RBC #/AREA URNS HPF: >100 /HPF (ref 0–5)
SP GR UR REFRACTOMETRY: 1.02 (ref 1–1.03)
UA: UC IF INDICATED,UAUC: ABNORMAL
UROBILINOGEN UR QL STRIP.AUTO: 1 EU/DL (ref 0.2–1)
WBC URNS QL MICRO: ABNORMAL /HPF (ref 0–4)

## 2017-10-27 PROCEDURE — 99282 EMERGENCY DEPT VISIT SF MDM: CPT

## 2017-10-27 PROCEDURE — 81025 URINE PREGNANCY TEST: CPT

## 2017-10-27 PROCEDURE — 81001 URINALYSIS AUTO W/SCOPE: CPT | Performed by: EMERGENCY MEDICINE

## 2017-10-27 PROCEDURE — 87086 URINE CULTURE/COLONY COUNT: CPT | Performed by: EMERGENCY MEDICINE

## 2017-10-27 RX ORDER — SODIUM CHLORIDE 9 MG/ML
150 INJECTION, SOLUTION INTRAVENOUS ONCE
Status: DISCONTINUED | OUTPATIENT
Start: 2017-10-27 | End: 2017-10-27

## 2017-10-27 NOTE — ED NOTES
RN went in to obtain IV access and blood work. Pt states she does not want her blood drawn and that she does not understand why we don't just do an ultrasound.

## 2017-10-28 NOTE — ED PROVIDER NOTES
HPI Comments: Patient presents to ED reports heavy vaginal bleeding today. Says she had an  on Monday and bleeding was light ,now very heavy. reports sharp lower abdominal pain today 10/10 pain intermittent  Denies fever  Chills nausea vomiting. Has not tried anything for the sx. Patient is a 24 y.o. female presenting with vaginal bleeding. The history is provided by the patient. No  was used. Vaginal Bleeding   This is a new problem. The current episode started more than 2 days ago. The problem occurs daily. The problem has been gradually worsening. Pertinent negatives include no chest pain, no abdominal pain, no headaches and no shortness of breath. Nothing aggravates the symptoms. Nothing relieves the symptoms. She has tried nothing for the symptoms. Past Medical History:   Diagnosis Date    Abnormal Pap smear     Anemia     Chronic kidney disease     Chronic pain     abdominal,lower right flank    Hydronephrosis, right 2014       Past Surgical History:   Procedure Laterality Date    HX GYN      vaginal birth x 2    HX OTHER SURGICAL  2016     urethral stent    HX OTHER SURGICAL  2017    urethral stents replaced multiple times, last placed right urethal stent in 2017    HX UROLOGICAL      stent placements         Family History:   Problem Relation Age of Onset    Elevated Lipids Mother     Hypertension Mother     Cancer Paternal Aunt     Cancer Paternal Uncle     Arthritis-osteo Maternal Grandmother     Diabetes Maternal Grandmother     Cancer Paternal Grandmother     Diabetes Paternal Grandmother        Social History     Social History    Marital status: SINGLE     Spouse name: N/A    Number of children: N/A    Years of education: N/A     Occupational History    Not on file.      Social History Main Topics    Smoking status: Former Smoker     Years: 2.00    Smokeless tobacco: Never Used    Alcohol use 3.6 oz/week     5 Glasses of wine, 1 Standard drinks or equivalent per week      Comment: ocassionally    Drug use: No      Comment: last used 1 week ago    Sexual activity: Yes     Partners: Male     Birth control/ protection: None     Other Topics Concern    Not on file     Social History Narrative         ALLERGIES: Review of patient's allergies indicates no known allergies. Review of Systems   Constitutional: Negative for fatigue and fever. HENT: Negative for sore throat. Respiratory: Negative for shortness of breath and wheezing. Cardiovascular: Negative for chest pain and palpitations. Gastrointestinal: Negative for abdominal pain. Genitourinary: Positive for vaginal bleeding. Musculoskeletal: Negative for arthralgias, myalgias, neck pain and neck stiffness. Skin: Negative for pallor and rash. Neurological: Negative for dizziness, tremors, weakness and headaches. Hematological: Negative for adenopathy. Psychiatric/Behavioral: Negative for agitation and behavioral problems. All other systems reviewed and are negative. Vitals:    10/27/17 1109   BP: 152/89   Pulse: (!) 107   Resp: 18   Temp: 98.2 °F (36.8 °C)   SpO2: 100%   Weight: 78.5 kg (173 lb)   Height: 5' 6\" (1.676 m)            Physical Exam   Constitutional: She is oriented to person, place, and time. She appears well-developed and well-nourished. No distress. HENT:   Head: Normocephalic and atraumatic. Right Ear: External ear normal.   Left Ear: External ear normal.   Nose: Nose normal.   Eyes: Conjunctivae are normal.   Neck: Normal range of motion. Neck supple. Cardiovascular: Normal rate and regular rhythm. Pulmonary/Chest: Effort normal and breath sounds normal. No respiratory distress. She has no wheezes. Abdominal: Soft. Bowel sounds are normal. There is no tenderness. Musculoskeletal: Normal range of motion. Lymphadenopathy:     She has no cervical adenopathy.    Neurological: She is alert and oriented to person, place, and time. No cranial nerve deficit. Skin: Skin is warm and dry. No rash noted. Psychiatric: She has a normal mood and affect. Her behavior is normal. Judgment and thought content normal.   Nursing note and vitals reviewed. MDM  Number of Diagnoses or Management Options  Vaginal bleeding:   Diagnosis management comments: DDX retained products of conception anemia PID       Amount and/or Complexity of Data Reviewed  Clinical lab tests: ordered      ED Course       Procedures    I explained plan of care to patient need for bloodwork and ultrasound to determine if retained products. She agreed to plan  At 12:47  Rn stated patient not in her room.  Gown on bed patient eloped

## 2017-10-29 LAB
BACTERIA SPEC CULT: NORMAL
CC UR VC: NORMAL
SERVICE CMNT-IMP: NORMAL

## 2017-11-04 ENCOUNTER — HOSPITAL ENCOUNTER (EMERGENCY)
Age: 27
Discharge: HOME | End: 2017-11-05
Attending: EMERGENCY MEDICINE
Payer: COMMERCIAL
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
## 2017-11-04 DIAGNOSIS — T07.XXXA MULTIPLE ABRASIONS: ICD-10-CM

## 2017-11-04 DIAGNOSIS — T07.XXXA MULTIPLE CONTUSIONS: ICD-10-CM

## 2017-11-04 PROCEDURE — 99283 EMERGENCY DEPT VISIT LOW MDM: CPT

## 2017-11-05 VITALS
HEART RATE: 125 BPM | OXYGEN SATURATION: 99 % | TEMPERATURE: 99 F | RESPIRATION RATE: 16 BRPM | BODY MASS INDEX: 27.64 KG/M2 | WEIGHT: 172 LBS | SYSTOLIC BLOOD PRESSURE: 155 MMHG | DIASTOLIC BLOOD PRESSURE: 86 MMHG | HEIGHT: 66 IN
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
## 2017-12-27 ENCOUNTER — OFFICE VISIT (OUTPATIENT)
Dept: OBGYN CLINIC | Age: 27
End: 2017-12-27

## 2017-12-27 VITALS
RESPIRATION RATE: 18 BRPM | DIASTOLIC BLOOD PRESSURE: 71 MMHG | SYSTOLIC BLOOD PRESSURE: 128 MMHG | WEIGHT: 176 LBS | TEMPERATURE: 97.5 F | BODY MASS INDEX: 28.28 KG/M2 | HEART RATE: 68 BPM | HEIGHT: 66 IN

## 2017-12-27 DIAGNOSIS — Z01.419 WELL WOMAN EXAM WITH ROUTINE GYNECOLOGICAL EXAM: ICD-10-CM

## 2017-12-27 DIAGNOSIS — N89.8 VAGINAL DISCHARGE: Primary | ICD-10-CM

## 2017-12-27 RX ORDER — NORETHINDRONE ACETATE AND ETHINYL ESTRADIOL 1MG-20(21)
1 KIT ORAL DAILY
Qty: 1 PACKAGE | Refills: 5 | Status: SHIPPED | OUTPATIENT
Start: 2017-12-27 | End: 2018-10-24

## 2017-12-27 NOTE — PROGRESS NOTES
Chief Complaint   Patient presents with    Well Woman    Family Planning     Pt states hasn't had a normal cycle since having the  in 10/2017. Pt states having heavy bleeding/spotting off/on since having the . Pt states when she pushes on her uterus, \"it's tender. \" Pt states she had an abnormal Pap about two years ago. Pt also states pain with intercourse.

## 2017-12-27 NOTE — PROGRESS NOTES
NEW PATIENT EXAM  Young Adult Woman    SUBJECTIVE: Rafal Reece is a 24 y.o. female   1921 [facility] [address] ( one stillbirth at term ? Preeclampsia) lwho presents for well woman and to discuss family planning. Patient's last menstrual period was 2017. GYN History  Menarche: as teen  Contraception:  none  Sexual History:  unremarkable  Sexually transmitted diseases/infections: NO    Last pap: unsure  The prior Pap result: reports history of abnormal pap    Past Medical History:   Diagnosis Date    Abnormal Pap smear     Anemia     Chronic kidney disease     Chronic pain     abdominal,lower right flank    Hydronephrosis, right 2014     Past Surgical History:   Procedure Laterality Date    HX GYN      vaginal birth x 2    HX OTHER SURGICAL  2016     urethral stent    HX OTHER SURGICAL  2017    urethral stents replaced multiple times, last placed right urethal stent in 2017    HX UROLOGICAL      stent placements     OB History      Para Term  AB Living    5 4 4 0 1 3    SAB TAB Ectopic Molar Multiple Live Births    1 0 0  0 3        Obstetric Comments     x 4, one pregnancy stillbirth at term--no fetal heart tones--possible preeclampsia        Family History   Problem Relation Age of Onset    Elevated Lipids Mother     Hypertension Mother     Cancer Paternal Aunt     Cancer Paternal Uncle     Arthritis-osteo Maternal Grandmother     Diabetes Maternal Grandmother     Cancer Paternal Grandmother     Diabetes Paternal Grandmother      Social History     Social History    Marital status: SINGLE     Spouse name: N/A    Number of children: N/A    Years of education: N/A     Occupational History    Not on file.      Social History Main Topics    Smoking status: Former Smoker     Years: 2.00    Smokeless tobacco: Never Used    Alcohol use 3.6 oz/week     5 Glasses of wine, 1 Standard drinks or equivalent per week      Comment: ocassionally    Drug use: No      Comment: last used 1 week ago    Sexual activity: Yes     Partners: Male     Birth control/ protection: None     Other Topics Concern    Not on file     Social History Narrative       Current Outpatient Prescriptions   Medication Sig Dispense Refill    norethindrone-ethinyl estradiol (JUNEL FE 1/20) 1 mg-20 mcg (21)/75 mg (7) tab Take 1 Tab by mouth daily. 1 Package 5         Review of Systems:   Complete review of systems reviewed from social and history data forms. Pertinent positives in HPI. Objective:     Visit Vitals    /71 (BP 1 Location: Right arm, BP Patient Position: Sitting)    Pulse 68    Temp 97.5 °F (36.4 °C) (Oral)    Resp 18    Ht 5' 6\" (1.676 m)    Wt 176 lb (79.8 kg)    LMP 12/05/2017    BMI 28.41 kg/m2       General:  alert, cooperative, no distress, appears stated age   Skin:  Normal.   Lymph Nodes:  Cervical, supraclavicular, and axillary nodes normal.   Breast Exam: normal appearance, no masses or tenderness    Lungs:  clear to auscultation bilaterally   Heart:  regular rate and rhythm, S1, S2 normal, no murmur, click, rub or gallop   Abdomen: soft, non-tender. Bowel sounds normal. No masses,  no organomegaly   Back:  Costovertebral angle tenderness absent   Genitourinary: BUS normal. Introitus normal. Normal appearing vaginal epithelium, Vaginal discharge described as normal and physiologic.,  Normal cervix without lesions or tenderness, Uterus normal size anteverted. NT., Adnexa normal in size left and right without tenderness. Extremities:  extremities normal, atraumatic, no cyanosis or edema         ASSESSMENT:      ICD-10-CM ICD-9-CM    1. Vaginal discharge N89.8 623.5 NUSWAB VAGINITIS   2. Well woman exam with routine gynecological exam Z01.419 V72.31 PAP IG, RFX HPV ASCU, 17&40,80([id number])     Plan:  Pap taken. Rx. David Courtney 1/20 #6, will decide regarding continuing OCP's or Mirena. NUSwab taken. RTO 1 year. Pt. Voices understanding of treatment plan.   Follow-up Disposition: Not on [facility], NP

## 2017-12-27 NOTE — PATIENT INSTRUCTIONS
Learning About Birth Control: Combination Pills  What are combination pills? Combination pills are used to prevent pregnancy. Most people call them \"the pill. \"  Combination pills release a regular dose of two hormones, estrogen and progestin. They prevent pregnancy in a few ways. They thicken the mucus in the cervix. This makes it hard for sperm to travel into the uterus. And they thin the lining of the uterus. This makes it harder for a fertilized egg to attach to the uterus. The hormones also can stop the ovaries from releasing an egg each month (ovulation). You have to take a pill every day to prevent pregnancy. The packages for these pills are different. The most common one has 3 weeks of hormone pills and 1 week of sugar pills. The sugar pills don't contain any hormones. You have your period on that week. But other packs have no sugar pills. If you take hormone pills for the whole month, you will not get your period as often. Or you may not get it at all. How well do they work? In the first year of use:  · When combination pills are taken exactly as directed, fewer than 1 woman out of 100 has an unplanned pregnancy. · When pills are not taken exactly as directed, such as forgetting to take them sometimes, 9 women out of 100 have an unplanned pregnancy. Be sure to tell your doctor about any health problems you have or medicines you take. He or she can help you choose the birth control method that is right for you. What are the advantages of combination pills? · These pills work better than barrier methods. Barrier methods include condoms and diaphragms. · They may reduce acne and heavy bleeding. They may also reduce cramping and other symptoms of PMS (premenstrual syndrome). · The pills let you control your periods. You can have periods every month or every few months. Or you can choose not to have them at all. · You don't have to interrupt sex to use the pills.   What are the disadvantages of combination pills? · You have to take a pill at the same time every day to prevent pregnancy. · Combination pills don't protect against sexually transmitted infections (STIs), such as herpes or HIV/AIDS. If you aren't sure if your sex partner might have an STI, use a condom to protect against disease. · They may cause changes in your period. You may have little bleeding, skipped periods, or spotting. · They may cause mood changes, less interest in sex, or weight gain. · Combination pills contain estrogen. They may not be right for you if you have certain health problems. Where can you learn more? Go to http://isabel-shabbir.info/. Enter Z485 in the search box to learn more about \"Learning About Birth Control: Combination Pills. \"  Current as of: March 16, 2017  Content Version: 11.4  © 6502-9713 Healthwise, Incorporated. Care instructions adapted under license by [employer] (which disclaims liability or warranty for this information). If you have questions about a medical condition or this instruction, always ask your healthcare professional. [address] any warranty or liability for your use of this information.

## 2017-12-27 NOTE — MR AVS SNAPSHOT
Visit Information Date & Time Provider Department Dept. Phone Encounter #  
 12/27/2017  1:00 PM Elia Melo NP [facility] [address] OBGYN AT [address] [facility] [id number] Your Appointments 1/22/2018  9:20 AM  
New Patient with Og Epperson MD  
[address] [place] [address] [facility]) Appt Note: New Pt est PCP  
 [place] [address] [facility] [id number]  
[phone]  
  
   
 [place] [address] Upcoming Health Maintenance Date Due  
 HPV AGE 9Y-34Y (1 of 3 - Female 3 Dose Series) 10/26/2003 PAP AKA CERVICAL CYTOLOGY 10/26/2013 Influenza Age 5 to Adult 8/1/2017 Allergies as of 12/27/2017  Review Complete On: 12/27/2017 By: Elia Melo NP No Known Allergies Current Immunizations  Reviewed on 2/13/2017 Name Date Tdap 6/29/2017 11:48 AM  
  
 Not reviewed this visit Vitals BP Pulse Temp Resp Height(growth percentile) Weight(growth percentile) 128/71 (BP 1 Location: Right arm, BP Patient Position: Sitting) 68 97.5 °F (36.4 °C) (Oral) 18 5' 6\" (1.676 m) 176 lb (79.8 kg) LMP BMI OB Status Smoking Status 12/05/2017 28.41 kg/m2 Having regular periods Former Smoker Vitals History BMI and BSA Data Body Mass Index Body Surface Area  
 28.41 kg/m 2 1.93 m 2 Preferred Pharmacy Pharmacy Name Phone [facility] [address] [facility], [address] [place] [address] [phone] Your Updated Medication List  
  
   
This list is accurate as of: 12/27/17  2:31 PM.  Always use your most recent med list.  
  
  
  
  
 norethindrone-ethinyl estradiol 1 mg-20 mcg (21)/75 mg (7) Tab Commonly known as:  Misty Katherine FE 1/20 Take 1 Tab by mouth daily. Prescriptions Printed Refills  
 norethindrone-ethinyl estradiol (JUNEL FE 1/20) 1 mg-20 mcg (21)/75 mg (7) tab 5 Sig: Take 1 Tab by mouth daily. Class: Print  Route: Oral  
  
 Patient Instructions Learning About Birth Control: Combination Pills What are combination pills? Combination pills are used to prevent pregnancy. Most people call them \"the pill. \" Combination pills release a regular dose of two hormones, estrogen and progestin. They prevent pregnancy in a few ways. They thicken the mucus in the cervix. This makes it hard for sperm to travel into the uterus. And they thin the lining of the uterus. This makes it harder for a fertilized egg to attach to the uterus. The hormones also can stop the ovaries from releasing an egg each month (ovulation). You have to take a pill every day to prevent pregnancy. The packages for these pills are different. The most common one has 3 weeks of hormone pills and 1 week of sugar pills. The sugar pills don't contain any hormones. You have your period on that week. But other packs have no sugar pills. If you take hormone pills for the whole month, you will not get your period as often. Or you may not get it at all. How well do they work? In the first year of use: · When combination pills are taken exactly as directed, fewer than 1 woman out of 100 has an unplanned pregnancy. · When pills are not taken exactly as directed, such as forgetting to take them sometimes, 9 women out of 100 have an unplanned pregnancy. Be sure to tell your doctor about any health problems you have or medicines you take. He or she can help you choose the birth control method that is right for you. What are the advantages of combination pills? · These pills work better than barrier methods. Barrier methods include condoms and diaphragms. · They may reduce acne and heavy bleeding. They may also reduce cramping and other symptoms of PMS (premenstrual syndrome). · The pills let you control your periods. You can have periods every month or every few months. Or you can choose not to have them at all. · You don't have to interrupt sex to use the pills. What are the disadvantages of combination pills? · You have to take a pill at the same time every day to prevent pregnancy. · Combination pills don't protect against sexually transmitted infections (STIs), such as herpes or HIV/AIDS. If you aren't sure if your sex partner might have an STI, use a condom to protect against disease. · They may cause changes in your period. You may have little bleeding, skipped periods, or spotting. · They may cause mood changes, less interest in sex, or weight gain. · Combination pills contain estrogen. They may not be right for you if you have certain health problems. Where can you learn more? Go to http://isabel-shabbir.info/. Enter D256 in the search box to learn more about \"Learning About Birth Control: Combination Pills. \" Current as of: March 16, 2017 Content Version: 11.4 © 6192-2230 Trendrating. Care instructions adapted under license by [employer] (which disclaims liability or warranty for this information). If you have questions about a medical condition or this instruction, always ask your healthcare professional. [address] any warranty or liability for your use of this information. Introducing [facility]! Scot Ibarra introduces Actionality patient portal. Now you can access parts of your medical record, email your doctor's office, and request medication refills online. 1. In your internet browser, go to [email or web address] 2. Click on the First Time User? Click Here link in the Sign In box. You will see the New Member Sign Up page. 3. Enter your Actionality Access Code exactly as it appears below. You will not need to use this code after youve completed the sign-up process. If you do not sign up before the expiration date, you must request a new code. · Actionality Access Code: [email or web address] Expires: 2/2/2018 11:30 PM 
 
 4. Enter the last four digits of your Social Security Number (xxxx) and Date of Birth (mm/dd/yyyy) as indicated and click Submit. You will be taken to the next sign-up page. 5. Create a BioDetego ID. This will be your BioDetego login ID and cannot be changed, so think of one that is secure and easy to remember. 6. Create a BioDetego password. You can change your password at any time. 7. Enter your Password Reset Question and Answer. This can be used at a later time if you forget your password. 8. Enter your e-mail address. You will receive e-mail notification when new information is available in [address] 9. Click Sign Up. You can now view and download portions of your medical record. 10. Click the Download Summary menu link to download a portable copy of your medical information. If you have questions, please visit the Frequently Asked Questions section of the BioDetego website. Remember, BioDetego is NOT to be used for urgent needs. For medical emergencies, dial 911. Now available from your iPhone and Android! Please provide this summary of care documentation to your next provider. Your primary care clinician is listed as PROVIDER UNKNOWN. If you have any questions after today's visit, please call [phone].

## 2017-12-28 ENCOUNTER — TELEPHONE (OUTPATIENT)
Dept: OBGYN CLINIC | Age: 27
End: 2017-12-28

## 2017-12-28 RX ORDER — FLUCONAZOLE 150 MG/1
150 TABLET ORAL DAILY
Qty: 1 TAB | Refills: 0 | Status: SHIPPED | OUTPATIENT
Start: 2017-12-28 | End: 2017-12-29

## 2017-12-29 LAB
A VAGINAE DNA VAG QL NAA+PROBE: ABNORMAL SCORE
BVAB2 DNA VAG QL NAA+PROBE: ABNORMAL SCORE
C ALBICANS DNA VAG QL NAA+PROBE: POSITIVE
C GLABRATA DNA VAG QL NAA+PROBE: NEGATIVE
CYTOLOGIST CVX/VAG CYTO: NORMAL
CYTOLOGY CVX/VAG DOC THIN PREP: NORMAL
DX ICD CODE: NORMAL
LABCORP, 190119: NORMAL
Lab: NORMAL
MEGA1 DNA VAG QL NAA+PROBE: ABNORMAL SCORE
OTHER STN SPEC: NORMAL
PATH REPORT.FINAL DX SPEC: NORMAL
STAT OF ADQ CVX/VAG CYTO-IMP: NORMAL
T VAGINALIS RRNA SPEC QL NAA+PROBE: NEGATIVE

## 2018-01-03 ENCOUNTER — TELEPHONE (OUTPATIENT)
Dept: OBGYN CLINIC | Age: 28
End: 2018-01-03

## 2018-01-03 RX ORDER — METRONIDAZOLE 500 MG/1
500 TABLET ORAL 2 TIMES DAILY
Qty: 14 TAB | Refills: 0 | Status: SHIPPED | OUTPATIENT
Start: 2018-01-03 | End: 2018-01-10

## 2018-01-03 RX ORDER — FLUCONAZOLE 100 MG/1
100 TABLET ORAL DAILY
Qty: 7 TAB | Refills: 0 | Status: SHIPPED | OUTPATIENT
Start: 2018-01-03 | End: 2018-01-10

## 2018-01-22 ENCOUNTER — OFFICE VISIT (OUTPATIENT)
Dept: INTERNAL MEDICINE CLINIC | Age: 28
End: 2018-01-22

## 2018-04-07 ENCOUNTER — HOSPITAL ENCOUNTER (EMERGENCY)
Age: 28
Discharge: HOME | End: 2018-04-07
Attending: EMERGENCY MEDICINE
Payer: COMMERCIAL

## 2018-04-07 VITALS
HEIGHT: 66 IN | DIASTOLIC BLOOD PRESSURE: 67 MMHG | OXYGEN SATURATION: 100 % | TEMPERATURE: 97.8 F | RESPIRATION RATE: 16 BRPM | HEART RATE: 103 BPM | WEIGHT: 160 LBS | SYSTOLIC BLOOD PRESSURE: 133 MMHG | BODY MASS INDEX: 25.71 KG/M2

## 2018-04-07 DIAGNOSIS — L08.9 INFECTED WOUND: Primary | ICD-10-CM

## 2018-04-07 DIAGNOSIS — N30.00 ACUTE CYSTITIS WITHOUT HEMATURIA: ICD-10-CM

## 2018-04-07 DIAGNOSIS — T14.8XXA INFECTED WOUND: Primary | ICD-10-CM

## 2018-04-07 LAB
APPEARANCE UR: ABNORMAL
BACTERIA URNS QL MICRO: ABNORMAL /HPF
BILIRUB UR QL: NEGATIVE
COLOR UR: ABNORMAL
EPITH CASTS URNS QL MICRO: ABNORMAL /LPF
GLUCOSE UR STRIP.AUTO-MCNC: NEGATIVE MG/DL
HCG UR QL: NEGATIVE
HGB UR QL STRIP: NEGATIVE
KETONES UR QL STRIP.AUTO: NEGATIVE MG/DL
LEUKOCYTE ESTERASE UR QL STRIP.AUTO: NEGATIVE
NITRITE UR QL STRIP.AUTO: NEGATIVE
PH UR STRIP: 6 [PH] (ref 5–8)
PROT UR STRIP-MCNC: NEGATIVE MG/DL
RBC #/AREA URNS HPF: ABNORMAL /HPF (ref 0–5)
SP GR UR REFRACTOMETRY: 1.01 (ref 1–1.03)
UA: UC IF INDICATED,UAUC: ABNORMAL
UROBILINOGEN UR QL STRIP.AUTO: 0.2 EU/DL (ref 0.2–1)
WBC URNS QL MICRO: ABNORMAL /HPF (ref 0–4)

## 2018-04-07 PROCEDURE — 81001 URINALYSIS AUTO W/SCOPE: CPT | Performed by: EMERGENCY MEDICINE

## 2018-04-07 PROCEDURE — 81025 URINE PREGNANCY TEST: CPT

## 2018-04-07 PROCEDURE — 99283 EMERGENCY DEPT VISIT LOW MDM: CPT

## 2018-04-07 PROCEDURE — 87086 URINE CULTURE/COLONY COUNT: CPT | Performed by: EMERGENCY MEDICINE

## 2018-04-07 RX ORDER — SULFAMETHOXAZOLE AND TRIMETHOPRIM 800; 160 MG/1; MG/1
1 TABLET ORAL 2 TIMES DAILY
Qty: 20 TAB | Refills: 0 | Status: SHIPPED | OUTPATIENT
Start: 2018-04-07 | End: 2018-04-17

## 2018-04-07 NOTE — ED PROVIDER NOTES
EMERGENCY DEPARTMENT HISTORY AND PHYSICAL EXAM      Date: 4/7/2018  Patient Name: Glenna Peace    History of Presenting Illness     Chief Complaint   Patient presents with    Finger Swelling    Urinary Pain       History Provided By: Patient    HPI: Glenna Peace, 24 y.o. female with PMHx significant for CKD, hydronephrosis, anemia, presents ambulatory to the ED with cc of worsening cut on right hand x week and lower abdominal pain x few days. Pt reports getting a small cut between her pinky and ring finger on her right hand. She notes the wound has scabbed over but is starting to turn green with surrounding redness and is concerned for infection. Regarding her abdominal pain, pt has had similar pain in the past when she was diagnosed with a UTI. Pt does have a hx of kidney stents, CKD, and hydronephrosis and has had frequent UTI's in the past. Pt denies any nausea, vomiting, diarrhea, CP, SOB, fever, dysuria, hematuria, urinary frequency. Social Hx: - Tobacco (former smoker), + EtOH (occasionally), - illicit drug use (-). PCP: Rubens Amin MD    There are no other complaints, changes, or physical findings at this time. Current Outpatient Prescriptions   Medication Sig Dispense Refill    norethindrone-ethinyl estradiol (JUNEL FE 1/20) 1 mg-20 mcg (21)/75 mg (7) tab Take 1 Tab by mouth daily.  1 Package 5       Past History     Past Medical History:  Past Medical History:   Diagnosis Date    Abnormal Pap smear     Anemia     Chronic kidney disease     Chronic pain     abdominal,lower right flank    Hydronephrosis, right 8/22/2014       Past Surgical History:  Past Surgical History:   Procedure Laterality Date    HX GYN      vaginal birth x 2    HX OTHER SURGICAL  2016 August     urethral stent    HX OTHER SURGICAL  03/2017    urethral stents replaced multiple times, last placed right urethal stent in march 2017    HX UROLOGICAL      stent placements       Family History:  Family History Problem Relation Age of Onset    Elevated Lipids Mother     Hypertension Mother     Cancer Paternal Aunt     Cancer Paternal Uncle     Arthritis-osteo Maternal Grandmother     Diabetes Maternal Grandmother     Cancer Paternal Grandmother     Diabetes Paternal Grandmother        Social History:  Social History   Substance Use Topics    Smoking status: Former Smoker     Years: 2.00    Smokeless tobacco: Never Used    Alcohol use 3.6 oz/week     5 Glasses of wine, 1 Standard drinks or equivalent per week      Comment: ocassionally       Allergies:  No Known Allergies      Review of Systems   Review of Systems   Constitutional: Negative for fever. HENT: Negative for sore throat. Eyes: Negative for photophobia and redness. Respiratory: Negative for shortness of breath and wheezing. Cardiovascular: Negative for chest pain and leg swelling. Gastrointestinal: Positive for abdominal pain (lower). Negative for blood in stool, nausea and vomiting. Genitourinary: Negative for difficulty urinating, dysuria, hematuria, menstrual problem and vaginal bleeding. Musculoskeletal: Negative for back pain and joint swelling. Skin: Positive for wound. Neurological: Negative for dizziness, seizures, syncope, speech difficulty, weakness, numbness and headaches. Hematological: Negative for adenopathy. Psychiatric/Behavioral: Negative for agitation, confusion and suicidal ideas. The patient is not nervous/anxious. Physical Exam   Physical Exam   Constitutional: She is oriented to person, place, and time. She appears well-developed and well-nourished. No distress. HENT:   Head: Normocephalic and atraumatic. Mouth/Throat: Oropharynx is clear and moist. No oropharyngeal exudate. Eyes: Conjunctivae and EOM are normal. Pupils are equal, round, and reactive to light. Left eye exhibits no discharge. Neck: Normal range of motion. Neck supple. No JVD present.    Cardiovascular: Normal rate, regular rhythm, normal heart sounds and intact distal pulses. Pulmonary/Chest: Effort normal and breath sounds normal. No respiratory distress. She has no wheezes. Abdominal: Soft. Bowel sounds are normal. She exhibits no distension. There is no tenderness. There is no rebound and no guarding. Musculoskeletal: Normal range of motion. She exhibits no edema or tenderness. Lymphadenopathy:     She has no cervical adenopathy. Neurological: She is alert and oriented to person, place, and time. She has normal reflexes. No cranial nerve deficit. Skin: Skin is warm and dry. There is erythema. 3.5 cm linear wound on the right interdigital space between the 5th and 4th fingers that is open with a little scab and mild surrounding erythema, no crepitus, no tenderness. Psychiatric: She has a normal mood and affect. Her behavior is normal.   Nursing note and vitals reviewed. Diagnostic Study Results     Labs -     Recent Results (from the past 12 hour(s))   URINALYSIS W/ REFLEX CULTURE    Collection Time: 04/07/18  7:47 AM   Result Value Ref Range    Color YELLOW/STRAW      Appearance CLOUDY (A) CLEAR      Specific gravity 1.015 1.003 - 1.030      pH (UA) 6.0 5.0 - 8.0      Protein NEGATIVE  NEG mg/dL    Glucose NEGATIVE  NEG mg/dL    Ketone NEGATIVE  NEG mg/dL    Bilirubin NEGATIVE  NEG      Blood NEGATIVE  NEG      Urobilinogen 0.2 0.2 - 1.0 EU/dL    Nitrites NEGATIVE  NEG      Leukocyte Esterase NEGATIVE  NEG      WBC 0-4 0 - 4 /hpf    RBC 0-5 0 - 5 /hpf    Epithelial cells FEW FEW /lpf    Bacteria 3+ (A) NEG /hpf    UA:UC IF INDICATED URINE CULTURE ORDERED (A) CNI     HCG URINE, QL. - POC    Collection Time: 04/07/18  7:52 AM   Result Value Ref Range    Pregnancy test,urine (POC) NEGATIVE  NEG       Medical Decision Making   I am the first provider for this patient. I reviewed the vital signs, available nursing notes, past medical history, past surgical history, family history and social history.     Vital Signs-Reviewed the patient's vital signs. Patient Vitals for the past 12 hrs:   Temp Pulse Resp BP SpO2   04/07/18 0734 97.8 °F (36.6 °C) (!) 103 16 133/67 100 %     Records Reviewed: Nursing Notes, Old Medical Records, Previous Radiology Studies and Previous Laboratory Studies    Provider Notes (Medical Decision Making):   DDx: Infected wound on right hand. UTI, Kidney stones. ED Course:   Initial assessment performed. The patients presenting problems have been discussed, and they are in agreement with the care plan formulated and outlined with them. I have encouraged them to ask questions as they arise throughout their visit. Critical Care Time:   None. Disposition:  DISCHARGE NOTE  8:59 AM  The patient has been re-evaluated and is ready for discharge. Reviewed available results with patient. Counseled pt on diagnosis and care plan. Pt has expressed understanding, and all questions have been answered. Pt agrees with plan and agrees to F/U as recommended, or return to the ED if their sxs worsen. Discharge instructions have been provided and explained to the pt, along with reasons to return to the ED. PLAN:  1. Current Discharge Medication List        2. Follow-up Information     Follow up With Details Comments [address], [place] In 1 week  [address] Giovani [place]  [phone]          Return to ED if worse     Diagnosis     Clinical Impression:   1. Infected wound    2. Acute cystitis without hematuria        Attestations: This note is prepared by Ervin Winter acting as Scribe for MD Ankita Bowens MD : The scribe's documentation has been prepared under my direction and personally reviewed by me in its entirety. I confirm that the note above accurately reflects all work, treatment, procedures, and medical decision making performed by me.

## 2018-04-07 NOTE — DISCHARGE INSTRUCTIONS
Female Urinary Tract: Anatomy Sketch    Current as of: October 13, 2016  Content Version: 11.4  © 1870-3919 Healthwise, Incorporated. Care instructions adapted under license by [employer] (which disclaims liability or warranty for this information). If you have questions about a medical condition or this instruction, always ask your healthcare professional. [place] [address] any warranty or liability for your use of this information.

## 2018-04-07 NOTE — ED NOTES
Pt ambulatory in ER with c/o rt 5th finger pain,and urinary sx. Emergency Department Nursing Plan of Care       The Nursing Plan of Care is developed from the Nursing assessment and Emergency Department Attending provider initial evaluation. The plan of care may be reviewed in the ED Provider note.     The Plan of Care was developed with the following considerations:   Patient / Family readiness to learn indicated by:verbalized understanding  Persons(s) to be included in education: patient  Barriers to Learning/Limitations:No    Signed     Kathy Ramirez RN    4/7/2018   7:57 AM

## 2018-04-08 LAB
BACTERIA SPEC CULT: ABNORMAL
BACTERIA SPEC CULT: ABNORMAL
CC UR VC: ABNORMAL
SERVICE CMNT-IMP: ABNORMAL

## 2018-05-23 ENCOUNTER — OFFICE VISIT (OUTPATIENT)
Dept: OBGYN CLINIC | Age: 28
End: 2018-05-23

## 2018-05-23 VITALS
DIASTOLIC BLOOD PRESSURE: 61 MMHG | TEMPERATURE: 98.1 F | SYSTOLIC BLOOD PRESSURE: 111 MMHG | WEIGHT: 171 LBS | RESPIRATION RATE: 16 BRPM | HEIGHT: 66 IN | BODY MASS INDEX: 27.48 KG/M2 | HEART RATE: 67 BPM

## 2018-05-23 DIAGNOSIS — N92.6 IRREGULAR MENSES: Primary | ICD-10-CM

## 2018-05-23 LAB
HCG URINE, QL. (POC): NEGATIVE
VALID INTERNAL CONTROL?: YES

## 2018-05-23 RX ORDER — ETONOGESTREL AND ETHINYL ESTRADIOL 11.7; 2.7 MG/1; MG/1
INSERT, EXTENDED RELEASE VAGINAL
Qty: 3 DEVICE | Refills: 2 | Status: SHIPPED | OUTPATIENT
Start: 2018-05-23 | End: 2018-10-24

## 2018-05-23 NOTE — PROGRESS NOTES
Chief Complaint   Patient presents with    Contraception     patient wants to switch from bcp to IUD     1. Have you been to the ER, urgent care clinic since your last visit? Hospitalized since your last visit? No    2. Have you seen or consulted any other health care providers outside of the [address] [place] [address] since your last visit? Include any pap smears or colon screening. No      Patient states cycles are very irregular with BCP and would like to switch.

## 2018-05-23 NOTE — PATIENT INSTRUCTIONS
Learning About Birth Control: The Ring  What is the ring? The ring is used to prevent pregnancy. It's a soft plastic ring that you put into your vagina. It's also called the vaginal ring. The ring releases a regular dose of the hormones estrogen and progestin. These hormones prevent pregnancy in three ways. They thicken the mucus in the cervix. This makes it hard for sperm to travel into the uterus. They thin the lining of the uterus, which makes it harder for a fertilized egg to attach to the uterus. The hormones also can stop the ovaries from releasing an egg each month (ovulation). The ring protects against pregnancy for 1 month at a time. You wear one ring for 3 weeks in a row and then go without a ring for 1 week. During this week, you have your period. Your period may be very light. How well does it work? In the first year of use:  · When the ring is used exactly as directed, fewer than 1 woman out of 100 has an unplanned pregnancy. · When the ring is not used exactly as directed, 9 or more women out of 100 have an unplanned pregnancy. What are the advantages of the ring? · The ring is more effective for preventing pregnancy than barrier methods of birth control, such as the condom or diaphragm. · It prevents pregnancy for up to 1 month at a time. · It may reduce acne, heavy bleeding and cramping, and symptoms of premenstrual syndrome. · The ring is convenient. You insert it only 1 time each month. You do not have to interrupt sex to protect against pregnancy. What are the disadvantages of the ring? · The ring doesn't protect against sexually transmitted infections (STIs), such as herpes or HIV/AIDS. If you aren't sure if your sex partner might have an STI, use a condom to protect against infection. · The ring may cause changes in your period. You may have little bleeding, skipped periods, or spotting. · It may cause mood changes, less interest in sex, or weight gain.   · The ring contains estrogen. It may not be right for you if you have certain health problems or concerns. · You must remember to change the ring on schedule. Where can you learn more? Go to http://isabel-shabbir.info/. Enter H169 in the search box to learn more about \"Learning About Birth Control: The Ring. \"  Current as of: March 16, 2017  Content Version: 11.4  © 2504-2122 [employer]. Care instructions adapted under license by [employer] (which disclaims liability or warranty for this information). If you have questions about a medical condition or this instruction, always ask your healthcare professional. [address] any warranty or liability for your use of this information.

## 2018-05-23 NOTE — MR AVS SNAPSHOT
Karyle Olp 
 
 
 [facility] [address] [facility] 
[phone] Patient: Dina Gregorio MRN: [id number] :1990 Visit Information Date & Time Provider Department Dept. Phone Encounter #  
 2018 10:30 AM Meagan De Leon NP [facility] AT [address] [facility] [id number] Follow-up Instructions Return for 2018- annual.  
  
[address] [facility] Maintenance Date Due  
 HPV Age 9Y-34Y (1 of 3 - Female 3 Dose Series) 10/26/2003 Influenza Age 5 to Adult 2018 PAP AKA CERVICAL CYTOLOGY 2020 Allergies as of 2018  Review Complete On: 2018 By: Meagan De Leon NP No Known Allergies Current Immunizations  Reviewed on 2017 Name Date Tdap 2017 11:48 AM  
  
 Not reviewed this visit You Were Diagnosed With   
  
 Codes Comments Irregular menses    -  Primary ICD-10-CM: N92.6 ICD-9-CM: 626.4 Vitals BP Pulse Temp Resp Height(growth percentile) Weight(growth percentile) 111/61 67 98.1 °F (36.7 °C) (Oral) 16 5' 6\" (1.676 m) 171 lb (77.6 kg) LMP Breastfeeding? BMI OB Status Smoking Status 2018 No 27.6 kg/m2 Having regular periods Former Smoker Vitals History BMI and BSA Data Body Mass Index Body Surface Area  
 27.6 kg/m 2 1.9 m 2 Preferred Pharmacy Pharmacy Name Phone [facility] [address] [phone] Your Updated Medication List  
  
   
This list is accurate as of 18 10:54 AM.  Always use your most recent med list.  
  
  
  
  
 ethinyl estradiol-etonogestrel 0.12-0.015 mg/24 hr vaginal ring Commonly known as:  Saman Balint Use as directed  
  
 norethindrone-ethinyl estradiol 1 mg-20 mcg (21)/75 mg (7) Tab Commonly known as:  Amada [place] FE  Take 1 Tab by mouth daily. Prescriptions Printed Refills ethinyl estradiol-etonogestrel (NUVARING) 0.12-0.015 mg/24 hr vaginal ring 2 Sig: Use as directed Class: Print We Performed the Following AMB POC URINE PREGNANCY TEST, VISUAL COLOR COMPARISON [75903 CPT(R)] Follow-up Instructions Return for December 2018- annual.  
  
  
Patient Instructions Learning About Birth Control: The Ring What is the ring? The ring is used to prevent pregnancy. It's a soft plastic ring that you put into your vagina. It's also called the vaginal ring. The ring releases a regular dose of the hormones estrogen and progestin. These hormones prevent pregnancy in three ways. They thicken the mucus in the cervix. This makes it hard for sperm to travel into the uterus. They thin the lining of the uterus, which makes it harder for a fertilized egg to attach to the uterus. The hormones also can stop the ovaries from releasing an egg each month (ovulation). The ring protects against pregnancy for 1 month at a time. You wear one ring for 3 weeks in a row and then go without a ring for 1 week. During this week, you have your period. Your period may be very light. How well does it work? In the first year of use: · When the ring is used exactly as directed, fewer than 1 woman out of 100 has an unplanned pregnancy. · When the ring is not used exactly as directed, 9 or more women out of 100 have an unplanned pregnancy. What are the advantages of the ring? · The ring is more effective for preventing pregnancy than barrier methods of birth control, such as the condom or diaphragm. · It prevents pregnancy for up to 1 month at a time. · It may reduce acne, heavy bleeding and cramping, and symptoms of premenstrual syndrome. · The ring is convenient. You insert it only 1 time each month. You do not have to interrupt sex to protect against pregnancy. What are the disadvantages of the ring?  
· The ring doesn't protect against sexually transmitted infections (STIs), such as herpes or HIV/AIDS. If you aren't sure if your sex partner might have an STI, use a condom to protect against infection. · The ring may cause changes in your period. You may have little bleeding, skipped periods, or spotting. · It may cause mood changes, less interest in sex, or weight gain. · The ring contains estrogen. It may not be right for you if you have certain health problems or concerns. · You must remember to change the ring on schedule. Where can you learn more? Go to http://isabel-shabbir.info/. Enter H169 in the search box to learn more about \"Learning About Birth Control: The Ring. \" Current as of: March 16, 2017 Content Version: 11.4 © 3701-9671 Healthwise, Incorporated. Care instructions adapted under license by [employer] (which disclaims liability or warranty for this information). If you have questions about a medical condition or this instruction, always ask your healthcare professional. [place] [address] any warranty or liability for your use of this information. Introducing [facility]! [employer] introduces MondeCafes patient portal. Now you can access parts of your medical record, email your doctor's office, and request medication refills online. 1. In your internet browser, go to [email or web address] 2. Click on the First Time User? Click Here link in the Sign In box. You will see the New Member Sign Up page. 3. Enter your MondeCafes Access Code exactly as it appears below. You will not need to use this code after youve completed the sign-up process. If you do not sign up before the expiration date, you must request a new code. · MondeC[employer]es Access Code: [email or web address] Expires: 7/6/2018  7:32 AM 
 
4. Enter the last four digits of your Social Security Number (xxxx) and Date of Birth (mm/dd/yyyy) as indicated and click Submit. You will be taken to the next sign-up page. 5. Create a Conmio ID. This will be your Conmio login ID and cannot be changed, so think of one that is secure and easy to remember. 6. Create a Conmio password. You can change your password at any time. 7. Enter your Password Reset Question and Answer. This can be used at a later time if you forget your password. 8. Enter your e-mail address. You will receive e-mail notification when new information is available in [address] 9. Click Sign Up. You can now view and download portions of your medical record. 10. Click the Download Summary menu link to download a portable copy of your medical information. If you have questions, please visit the Frequently Asked Questions section of the Conmio website. Remember, Conmio is NOT to be used for urgent needs. For medical emergencies, dial 911. Now available from your iPhone and Android! Please provide this summary of care documentation to your next provider. Your primary care clinician is listed as Dufm Brunner. If you have any questions after today's visit, please call [phone].

## 2018-05-23 NOTE — PROGRESS NOTES
FOLLOW UP VISIT    SUBJECTIVE: Cherelle Green is a 24 y.o. female  using [address]  comes today to discuss contraceptive options. Pt. Does well with her OCP's however does experience BTB several times per month. UPT is negative today. Pt. Is interested in changing her method to eliminate the unexpected bleeding events. All methods will be options however she is strongly considering the Mirena due to her desire for LARC. Patient's last menstrual period was 2018. .    ROS: Pertinent items are noted in HPI. OBJECTIVE:     Visit Vitals    /61    Pulse 67    Temp 98.1 °F (36.7 °C) (Oral)    Resp 16    Ht 5' 6\" (1.676 m)    Wt 171 lb (77.6 kg)    LMP 2018    Breastfeeding No    BMI 27.6 kg/m2       General:  alert, cooperative, no distress, appears stated age   Skin:  Normal.   Extremities:  extremities normal, atraumatic, no cyanosis or edema   Neurologic:  negative   Psychiatric:  non focal       ASSESSMENT:      ICD-10-CM ICD-9-CM    1. Irregular menses N92.6 626.4 AMB POC URINE PREGNANCY TEST, VISUAL COLOR COMPARISON     Plan;  UPT negative. Rx, for Nuva Ring #3, 2 refills given. Information pamphlet on Mirena given to consider. RTO 2018 for annual.  Start ring with next menses. Pt. Voices understanding of treatment plan.   Follow-up Disposition:  Return for 2018- annual.    Lee Naranjo NP

## 2018-10-05 ENCOUNTER — HOSPITAL ENCOUNTER (EMERGENCY)
Age: 28
Discharge: HOME | End: 2018-10-05
Attending: EMERGENCY MEDICINE
Payer: COMMERCIAL

## 2018-10-05 VITALS
TEMPERATURE: 99.1 F | RESPIRATION RATE: 16 BRPM | HEIGHT: 67 IN | HEART RATE: 98 BPM | WEIGHT: 158.73 LBS | DIASTOLIC BLOOD PRESSURE: 99 MMHG | OXYGEN SATURATION: 100 % | BODY MASS INDEX: 24.91 KG/M2 | SYSTOLIC BLOOD PRESSURE: 165 MMHG

## 2018-10-05 DIAGNOSIS — R03.0 ELEVATED BLOOD PRESSURE READING: ICD-10-CM
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
## 2018-10-05 DIAGNOSIS — S29.019A ACUTE THORACIC MYOFASCIAL STRAIN, INITIAL ENCOUNTER: ICD-10-CM

## 2018-10-05 DIAGNOSIS — S46.911A STRAIN OF RIGHT SHOULDER, INITIAL ENCOUNTER: ICD-10-CM

## 2018-10-05 PROCEDURE — 74011250637 HC RX REV CODE- 250/637: Performed by: EMERGENCY MEDICINE

## 2018-10-05 PROCEDURE — 99284 EMERGENCY DEPT VISIT MOD MDM: CPT

## 2018-10-05 PROCEDURE — 74011000250 HC RX REV CODE- 250: Performed by: EMERGENCY MEDICINE

## 2018-10-05 RX ORDER — LIDOCAINE 4 G/100G
1 PATCH TOPICAL ONCE
Status: DISCONTINUED | OUTPATIENT
Start: 2018-10-05 | End: 2018-10-05 | Stop reason: HOSPADM

## 2018-10-05 RX ORDER — NAPROXEN 250 MG/1
500 TABLET ORAL ONCE
Status: COMPLETED | OUTPATIENT
Start: 2018-10-05 | End: 2018-10-05

## 2018-10-05 RX ORDER — LIDOCAINE 50 MG/G
PATCH TOPICAL
Qty: 1 EACH | Refills: 0 | Status: SHIPPED | OUTPATIENT
Start: 2018-10-05 | End: 2019-01-15

## 2018-10-05 RX ORDER — METHOCARBAMOL 750 MG/1
750 TABLET, FILM COATED ORAL ONCE
Status: COMPLETED | OUTPATIENT
Start: 2018-10-05 | End: 2018-10-05

## 2018-10-05 RX ORDER — METHOCARBAMOL 750 MG/1
750 TABLET, FILM COATED ORAL 4 TIMES DAILY
Qty: 20 TAB | Refills: 0 | Status: SHIPPED | OUTPATIENT
Start: 2018-10-05 | End: 2019-01-15

## 2018-10-05 RX ORDER — NAPROXEN 500 MG/1
500 TABLET, DELAYED RELEASE ORAL 2 TIMES DAILY WITH MEALS
Qty: 20 TAB | Refills: 0 | Status: SHIPPED | OUTPATIENT
Start: 2018-10-05 | End: 2018-10-24

## 2018-10-05 RX ADMIN — NAPROXEN 500 MG: 250 TABLET ORAL at 01:57

## 2018-10-05 RX ADMIN — METHOCARBAMOL 750 MG: 750 TABLET ORAL at 01:57
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
## 2018-10-05 NOTE — LETTER
[address] 
[facility] EMERGENCY DEPT 
[address] [place] [address] [address] [id number] [phone] Work/School Note Date: 10/5/2018 To Whom It May concern: 
 
Sheree Dillon was seen and treated today in the emergency room by the following provider(s): 
Attending Provider: Felix Aguirre MD. Sheree Dillon may return to work on 10/8/18. Sincerely, Felix Aguirre M.D.

## 2018-10-24 ENCOUNTER — OFFICE VISIT (OUTPATIENT)
Dept: INTERNAL MEDICINE CLINIC | Age: 28
End: 2018-10-24

## 2018-10-24 VITALS
WEIGHT: 164.4 LBS | RESPIRATION RATE: 16 BRPM | HEIGHT: 67 IN | OXYGEN SATURATION: 99 % | BODY MASS INDEX: 25.8 KG/M2 | HEART RATE: 94 BPM | SYSTOLIC BLOOD PRESSURE: 102 MMHG | DIASTOLIC BLOOD PRESSURE: 72 MMHG | TEMPERATURE: 98 F

## 2018-10-24 DIAGNOSIS — Z23 ENCOUNTER FOR IMMUNIZATION: ICD-10-CM

## 2018-10-24 DIAGNOSIS — N92.6 IRREGULAR MENSES: ICD-10-CM

## 2018-10-24 DIAGNOSIS — Q62.62: ICD-10-CM

## 2018-10-24 DIAGNOSIS — Z3A.01 LESS THAN 8 WEEKS GESTATION OF PREGNANCY: ICD-10-CM

## 2018-10-24 DIAGNOSIS — R30.0 DYSURIA: ICD-10-CM

## 2018-10-24 DIAGNOSIS — M54.6 ACUTE MIDLINE THORACIC BACK PAIN: Primary | ICD-10-CM

## 2018-10-24 PROBLEM — Z34.90 PREGNANCY: Status: RESOLVED | Noted: 2017-02-13 | Resolved: 2018-10-24

## 2018-10-24 PROBLEM — F10.11 HISTORY OF ALCOHOL ABUSE: Status: ACTIVE | Noted: 2018-10-24

## 2018-10-24 PROBLEM — F43.23 ADJUSTMENT DISORDER WITH MIXED ANXIETY AND DEPRESSED MOOD: Status: ACTIVE | Noted: 2018-10-24

## 2018-10-24 LAB
BILIRUB UR QL STRIP: NEGATIVE
GLUCOSE UR-MCNC: NEGATIVE MG/DL
HCG URINE, QL. (POC): POSITIVE
KETONES P FAST UR STRIP-MCNC: NEGATIVE MG/DL
PH UR STRIP: 7 [PH] (ref 4.6–8)
PROT UR QL STRIP: NEGATIVE
SP GR UR STRIP: 1.01 (ref 1–1.03)
UA UROBILINOGEN AMB POC: NORMAL (ref 0.2–1)
URINALYSIS CLARITY POC: CLEAR
URINALYSIS COLOR POC: YELLOW
URINE BLOOD POC: NEGATIVE
URINE LEUKOCYTES POC: NORMAL
URINE NITRITES POC: NEGATIVE
VALID INTERNAL CONTROL?: YES

## 2018-10-24 RX ORDER — IBUPROFEN 400 MG/1
400-800 TABLET ORAL
Qty: 60 TAB | Refills: 0 | Status: SHIPPED | OUTPATIENT
Start: 2018-10-24 | End: 2018-10-31 | Stop reason: SDUPTHER
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
## 2018-10-24 NOTE — LETTER
10/29/2018 2:42 PM 
 
Ms. Patricio Prasad [facility] [phone] Dear Patricio Prasad: 
 
Please find your most recent results below. Your urine culture grew NO bacteria. You do NOT have a bladder infection. Resulted Orders CULTURE, URINE Result Value Ref Range Urine Culture, Routine No growth Narrative Performed at:  [address] [place] [address]  [id number] : Rowena Gonzalez MD, Phone:  [phone] AMB POC URINE PREGNANCY TEST, VISUAL COLOR COMPARISON Result Value Ref Range VALID INTERNAL CONTROL POC Yes HCG urine, Ql. (POC) Positive Negative AMB POC URINALYSIS DIP STICK AUTO W/O MICRO Result Value Ref Range Color (UA POC) Yellow Clarity (UA POC) Clear Glucose (UA POC) Negative Negative Bilirubin (UA POC) Negative Negative Ketones (UA POC) Negative Negative Specific gravity (UA POC) 1.015 1.001 - 1.035 Blood (UA POC) Negative Negative pH (UA POC) 7.0 4.6 - 8.0 Protein (UA POC) Negative Negative Urobilinogen (UA POC) 0.2 mg/dL 0.2 - 1 Nitrites (UA POC) Negative Negative Leukocyte esterase (UA POC) Trace Negative Please call me if you have any questions: [phone] Sincerely, Roman Blankenship MD

## 2018-10-24 NOTE — PATIENT INSTRUCTIONS
Vaccine Information Statement Influenza (Flu) Vaccine (Inactivated or Recombinant): What you need to know Many Vaccine Information Statements are available in Arabic and other languages. See www.immunize.org/vis Hojas de Información Sobre Vacunas están disponibles en Español y en muchos otros idiomas. Visite www.immunize.org/vis 1. Why get vaccinated? Influenza (flu) is a contagious disease that spreads around the [place] every year, usually between October and May. Flu is caused by influenza viruses, and is spread mainly by coughing, sneezing, and close contact. Anyone can get flu. Flu strikes suddenly and can last several days. Symptoms vary by age, but can include: 
 fever/chills  sore throat  muscle aches  fatigue  cough  headache  runny or stuffy nose Flu can also lead to pneumonia and blood infections, and cause diarrhea and seizures in children. If you have a medical condition, such as heart or lung disease, flu can make it worse. Flu is more dangerous for some people. Infants and young children, people 72years of age and older, pregnant women, and people with certain health conditions or a weakened immune system are at greatest risk. Each year thousands of people in the [facility] die from flu, and many more are hospitalized. Flu vaccine can: 
 keep you from getting flu, 
 make flu less severe if you do get it, and 
 keep you from spreading flu to your family and other people. 2. Inactivated and recombinant flu vaccines A dose of flu vaccine is recommended every flu season. Children 6 months through 6years of age may need two doses during the same flu season. Everyone else needs only one dose each flu season.   
 
 
Some inactivated flu vaccines contain a very small amount of a mercury-based preservative called thimerosal. Studies have not shown thimerosal in vaccines to be harmful, but flu vaccines that do not contain thimerosal are available. There is no live flu virus in flu shots. They cannot cause the flu. There are many flu viruses, and they are always changing. Each year a new flu vaccine is made to protect against three or four viruses that are likely to cause disease in the upcoming flu season. But even when the vaccine doesnt exactly match these viruses, it may still provide some protection Flu vaccine cannot prevent: 
 flu that is caused by a virus not covered by the vaccine, or 
 illnesses that look like flu but are not. It takes about 2 weeks for protection to develop after vaccination, and protection lasts through the flu season. 3. Some people should not get this vaccine Tell the person who is giving you the vaccine:  If you have any severe, life-threatening allergies. If you ever had a life-threatening allergic reaction after a dose of flu vaccine, or have a severe allergy to any part of this vaccine, you may be advised not to get vaccinated. Most, but not all, types of flu vaccine contain a small amount of egg protein.  If you ever had Guillain-Barré Syndrome (also called GBS). Some people with a history of GBS should not get this vaccine. This should be discussed with your doctor.  If you are not feeling well. It is usually okay to get flu vaccine when you have a mild illness, but you might be asked to come back when you feel better. 4. Risks of a vaccine reaction With any medicine, including vaccines, there is a chance of reactions. These are usually mild and go away on their own, but serious reactions are also possible. Most people who get a flu shot do not have any problems with it. Minor problems following a flu shot include:  
 soreness, redness, or swelling where the shot was given  hoarseness  sore, red or itchy eyes  cough  fever  aches  headache  itching  fatigue If these problems occur, they usually begin soon after the shot and last 1 or 2 days. More serious problems following a flu shot can include the following:  There may be a small increased risk of Guillain-Barré Syndrome (GBS) after inactivated flu vaccine. This risk has been estimated at 1 or 2 additional cases per million people vaccinated. This is much lower than the risk of severe complications from flu, which can be prevented by flu vaccine.  Young children who get the flu shot along with pneumococcal vaccine (PCV13) and/or DTaP vaccine at the same time might be slightly more likely to have a seizure caused by fever. Ask your doctor for more information. Tell your doctor if a child who is getting flu vaccine has ever had a seizure. Problems that could happen after any injected vaccine:  People sometimes faint after a medical procedure, including vaccination. Sitting or lying down for about 15 minutes can help prevent fainting, and injuries caused by a fall. Tell your doctor if you feel dizzy, or have vision changes or ringing in the ears.  Some people get severe pain in the shoulder and have difficulty moving the arm where a shot was given. This happens very rarely.  Any medication can cause a severe allergic reaction. Such reactions from a vaccine are very rare, estimated at about 1 in a million doses, and would happen within a few minutes to a few hours after the vaccination. As with any medicine, there is a very remote chance of a vaccine causing a serious injury or death. The safety of vaccines is always being monitored. For more information, visit: www.cdc.gov/vaccinesafety/ 
 
5. What if there is a serious reaction? What should I look for?  Look for anything that concerns you, such as signs of a severe allergic reaction, very high fever, or unusual behavior.  
 
Signs of a severe allergic reaction can include hives, swelling of the face and throat, difficulty breathing, a fast heartbeat, dizziness, and weakness  usually within a few minutes to a few hours after the vaccination. What should I do?  If you think it is a severe allergic reaction or other emergency that cant wait, call 9-1-1 and get the person to the nearest hospital. Otherwise, call your doctor.  Reactions should be reported to the Vaccine Adverse Event Reporting System (VAERS). Your doctor should file this report, or you can do it yourself through  the VAERS web site at www.vaers. [facility].gov, or by calling 1-782.143.8598. VAERS does not give medical advice. 6. The National Vaccine Injury Compensation Program 
 
The [facility] Vaccine Injury Compensation Program (VICP) is a federal program that was created to compensate people who may have been injured by certain vaccines. Persons who believe they may have been injured by a vaccine can learn about the program and about filing a claim by calling 9-291.402.1094 or visiting the [facility] [employer] website at www.[facility].gov/vaccinecompensation. There is a time limit to file a claim for compensation. 7. How can I learn more?  Ask your healthcare provider. He or she can give you the vaccine package insert or suggest other sources of information.  Call your local or state health department.  Contact the Centers for Disease Control and Prevention (CDC): 
- Call 3-775.484.7839 (1-800-CDC-INFO) or 
- Visit CDCs website at www.cdc.gov/flu Vaccine Information Statement Inactivated Influenza Vaccine 8/7/2015 
42 SUZAN Gomes [id number]-[id number] [facility] and [employer] Centers for Disease Control and Prevention Office Use Only

## 2018-10-26 LAB — BACTERIA UR CULT: NO GROWTH

## 2018-10-31 ENCOUNTER — HOSPITAL ENCOUNTER (OUTPATIENT)
Dept: PHYSICAL THERAPY | Age: 28
Discharge: HOME | End: 2018-10-31
Payer: COMMERCIAL

## 2018-10-31 PROCEDURE — 97110 THERAPEUTIC EXERCISES: CPT

## 2018-10-31 PROCEDURE — G8978 MOBILITY CURRENT STATUS: HCPCS

## 2018-10-31 PROCEDURE — G8979 MOBILITY GOAL STATUS: HCPCS

## 2018-10-31 PROCEDURE — 97161 PT EVAL LOW COMPLEX 20 MIN: CPT
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
## 2018-11-07 ENCOUNTER — HOSPITAL ENCOUNTER (OUTPATIENT)
Dept: PHYSICAL THERAPY | Age: 28
Discharge: HOME | End: 2018-11-07
Payer: COMMERCIAL

## 2018-11-07 PROCEDURE — 97140 MANUAL THERAPY 1/> REGIONS: CPT

## 2018-11-07 PROCEDURE — 97110 THERAPEUTIC EXERCISES: CPT

## 2018-11-07 NOTE — PROGRESS NOTES
[address] OUTPATIENT physical Therapy DAILY Treatment NOTe Visit: 2 NAME: Brant Cline AGE: 34 y.o. GENDER: female DATE: 11/7/2018 REFERRING PHYSICIAN: Akhil Sheehan MD 
 
 
 
 
GOALS Short term goals Time frame: 3 weeks 1. Patient will be compliance and independent with the initial HEP as evidenced by being able to perform without cuing. 2. Patient will report a 50% improvement in symptoms. 3. Patient report a 50% improvement in sleeping. 4. Patient will tolerate 15 minutes of clinic activities before onset of symptoms. Long term goals Time frame: 6 weeks 1. Patient will reports pain level decreased to 2/10 to allow increased ease of movement. 2. Patient will have an improved score on the TXU Marquita  outcome measure by 10 points to demonstrate an increase in functional activity tolerance. 3. Patient will be independent in final individualized HEP. 4. Patient will return to full work duties without being limited by symptoms. 5. Patient will sleep 6-8 hours without being interrupted by pain. SUBJECTIVE:  
Pain right shoulder hurts worse at night Sleeps on left side, Stiff in the AM. \"I lifted weights and it hurt after that\" Pain In: 9/10 OBJECTIVE DATA SUMMARY:  
 
Pain: 
Location: Right shoulder and neck, shoulder blade Quality: aching and burning Now:7/10 Best: Worst:8/10 Factors that improve pain: heat Posture: WNL Strength:  
Right UE WNL Range of Motion:  
Right UE ROM WNL, pain at end range around shoulder blade Spinal Assessment:  
WNL Joint Mobility:  
Thoracic spine, hypomobile. Cx distraction and Cx distraction with rotation decrease pain. Palpation:  
TTP right UT, levator, rhomboids and over tx spinous processes. Neurologic Assessment: 
 Tone: WNL Sensation: normal 
 Reflexes: not tested Special Tests: - Spurlings Mobility: Transitional Movements: WNL  Gait: WNL Balance: WNL Functional Measure: In compliance with CMSs Claims Based Outcome Reporting, the following G-code set was chosen for this patient based on their primary functional limitation being treated: The outcome measure chosen to determine the severity of the functional limitation was the TXU Marquita with a score of 14/24 which was correlated with the impairment scale. ? Mobility - Walking and Moving Around:  
  - CURRENT STATUS: CK - 40%-59% impaired, limited or restricted  - GOAL STATUS: CI - 1%-19% impaired, limited or restricted  - D/C STATUS:  ---------------To be determined--------------- Physical Therapy Evaluation Charge Determination History Examination Presentation Decision-Making LOW Complexity : Zero comorbidities / personal factors that will impact the outcome / POC LOW Complexity : 1-2 Standardized tests and measures addressing body structure, function, activity limitation and / or participation in recreation  LOW Complexity : Stable, uncomplicated  LOW Complexity : FOTO score of  Based on the above components, the patient evaluation is determined to be of the following complexity level: LOW  
 
TREATMENT/INTERVENTION:  PT IS 6 WEEKS PREGNANT Modalities (Rationale): MHP post treatment to decrease pain and musscle guarding Therapeutic Exercises: HEP  Supine over vertical towel roll for thoracic mob, shoulder blade squeeze, UT stretch, Levator stretch, Angry Cat, child's pose, thoracic side bend. Clinic Activities Supine Laying over foam roller x 1 minute Seating UT stretch 3 reps 15 sec hold Levator stretch 3 reps 1 5 sec hold Shoulder blade squeeze x 10 reps Upper thoracic/lower cervical stretch 4 reps 20 sec hold Flexion/ext over back of chair with vertical roll 10 reps Standing Rows with green TB 10 reps 2 sets Pull downs with green TB 10 reps 2 sets UBE/LBE 5 minutes no resistance All 4's Angry Cat 7 reps Child's pose 3 reps with 20 sec hold Prone Superman lift 5 reps with 5 sec hold Manual Therapy: 
Grade 2 prone mob of thoracic spine, painful TTP right rhomboids,right thoracic paraspinals UT and levator Neuro Re-Education: 
Discussed using lumbar support to improve head and neck alignment Balance Training: 
none Ambulation/Gait Training: 
none Activity tolerance and post treatment pain report:  
Good tolerance Pain Out:  
 
Education: 
Education was provided to the patient on the following topics: Pt reports that she went to the gym and lifted weights (up to 15#) and her shoulder hurt afterwards. Instructed pt not to lift wts at the gym at this point in her recovery. Rut Kim [x]    No changes were made to the home exercise program. 
[]    The following changes were made to the home exercise program:  
Patient verbalized understanding of the topics presented. ASSESSMENT:  
Pt returns today following IE, she reports continued pain between shoulder blade and spine on right as well as right neck. Pt went to gym and lifted weights, up to 15#, educated pt not to lift wts at this point in her recovery. Toelrated all exercises well, remains TTP in right rhomboids, thoracic paraspinals, UT and levator. Patients progression toward goals is as follows: 
[x]     Improving appropriately and progressing toward goals 
[]     Improving slowly and progressing toward goals 
[]     Not making progress toward goals and plan of care will be adjusted PLAN OF CARE:  
Patient continues to benefit from skilled intervention to address the above impairments. [x]    Continue treatment per established plan of care. []     Recommend the following changes to the plan of care:  
 
Recommendations/Intent for next treatment: Advance exercises, IASTM trial,  Advance UBE, ES trial 
 
Ella Limon, ZACH Time Calculation: 45 mins Patient Time in clinic: 
 Start Time: 1400 Stop Time: 5284

## 2018-11-12 ENCOUNTER — HOSPITAL ENCOUNTER (OUTPATIENT)
Dept: PHYSICAL THERAPY | Age: 28
Discharge: HOME | End: 2018-11-12
Payer: COMMERCIAL

## 2018-11-12 PROCEDURE — 97140 MANUAL THERAPY 1/> REGIONS: CPT

## 2018-11-12 PROCEDURE — 97110 THERAPEUTIC EXERCISES: CPT

## 2018-11-12 NOTE — PROGRESS NOTES
[facility] 
[address] [place] [address], [address] [facility] OUTPATIENT physical Therapy DAILY Treatment NOTe Visit: 3 NAME: Flora Gamino AGE: 34 y.o. GENDER: female DATE: 11/12/2018 REFERRING PHYSICIAN: Lisy Quesada MD 
 
 
 
 
GOALS Short term goals Time frame: 3 weeks 1. Patient will be compliance and independent with the initial HEP as evidenced by being able to perform without cuing. 2. Patient will report a 50% improvement in symptoms. 3. Patient report a 50% improvement in sleeping. 4. Patient will tolerate 15 minutes of clinic activities before onset of symptoms. Long term goals Time frame: 6 weeks 1. Patient will reports pain level decreased to 2/10 to allow increased ease of movement. 2. Patient will have an improved score on the TXU Marquita  outcome measure by 10 points to demonstrate an increase in functional activity tolerance. 3. Patient will be independent in final individualized HEP. 4. Patient will return to full work duties without being limited by symptoms. 5. Patient will sleep 6-8 hours without being interrupted by pain. SUBJECTIVE:  
Pain right shoulder hurts worse at night Sleeps on left side, Stiff in the AM. Pain In: 7/10 OBJECTIVE DATA SUMMARY:  
 
Pain: 
Location: Right shoulder and neck, shoulder blade Quality: aching and burning Now:7/10 Best: Worst:8/10 Factors that improve pain: heat Posture: WNL Strength:  
Right UE WNL Range of Motion:  
Right UE ROM WNL, pain at end range around shoulder blade Spinal Assessment:  
WNL Joint Mobility:  
Thoracic spine, hypomobile. Cx distraction and Cx distraction with rotation decrease pain. Palpation:  
TTP right UT, levator, rhomboids and over tx spinous processes. Neurologic Assessment: 
 Tone: WNL Sensation: normal 
 Reflexes: not tested Special Tests: - Spurlings Mobility: 
 Transitional Movements: WNL  Gait: WNL Balance: WNL Functional Measure: In compliance with CMSs Claims Based Outcome Reporting, the following G-code set was chosen for this patient based on their primary functional limitation being treated: The outcome measure chosen to determine the severity of the functional limitation was the TXU Marquita with a score of 14/24 which was correlated with the impairment scale. ? Mobility - Walking and Moving Around:  
  - CURRENT STATUS: CK - 40%-59% impaired, limited or restricted  - GOAL STATUS: CI - 1%-19% impaired, limited or restricted  - D/C STATUS:  ---------------To be determined--------------- Physical Therapy Evaluation Charge Determination History Examination Presentation Decision-Making LOW Complexity : Zero comorbidities / personal factors that will impact the outcome / POC LOW Complexity : 1-2 Standardized tests and measures addressing body structure, function, activity limitation and / or participation in recreation  LOW Complexity : Stable, uncomplicated  LOW Complexity : FOTO score of  Based on the above components, the patient evaluation is determined to be of the following complexity level: LOW  
 
TREATMENT/INTERVENTION:  PT IS 6 WEEKS PREGNANT Modalities (Rationale): MHP post treatment to decrease pain and musscle guarding Therapeutic Exercises: HEP  Supine over vertical towel roll for thoracic mob, shoulder blade squeeze, UT stretch, Levator stretch, Angry Cat, child's pose, thoracic side bend. Clinic Activities Supine Laying over foam roller x 1 minute Seated UT stretch 3 reps 15 sec hold Levator stretch 3 reps 1 5 sec hold Upper thoracic/lower cervical stretch 4 reps 20 sec hold Flexion/ext over back of chair with vertical roll 10 reps Rows seated on ball green TB x 10 reps Standing Rows with green TB 10 reps 2 sets Pull downs with green TB 10 reps 2 sets Wall push ups x 15 reps UBE/LBE 6 minutes 3 resistance All 4's Angry Cat 7 reps Child's pose 3 reps with 20 sec hold UE extension with 2# wt 5 reps b/L, increased pain Prone Superman lift 5 reps with 5 sec hold Prone press ups x 10 reps Shoulder ext, shoulder blade squeeze with 2# x 10 reps B Supine Stretch over blue foam x 1.5 minutes Punches to the ceiling 3# x 10 reps B Manual Therapy: 
Grade 2 prone mob of thoracic spine, painful TTP right rhomboids,right thoracic paraspinals UT and levator Educated on use of tennis ball for self STM. Neuro Re-Education: 
Discussed using lumbar support to improve head and neck alignment Balance Training: 
none Ambulation/Gait Training: 
none Activity tolerance and post treatment pain report:  
Good tolerance Pain Out:  
 
Education: 
Education was provided to the patient on the following topics: Pt reports that she went to the gym and lifted weights (up to 15#) and her shoulder hurt afterwards. Instructed pt not to lift wts at the gym at this point in her recovery. Akilah Adams [x]    No changes were made to the home exercise program. 
[]    The following changes were made to the home exercise program:  
Patient verbalized understanding of the topics presented. ASSESSMENT:  
Pt reports continued pain between shoulder blade and spine on right as well as right neck, 7/10 . Cassiastu Angie Tolerated all exercises well, remains TTP in right rhomboids, thoracic paraspinals, UT and levator. Advanced scapular stabilization exercises as above, with good tolerance. Encouraged her to use tennis ball for STM. Will try IASTM next visit. Patients progression toward goals is as follows: 
[x]     Improving appropriately and progressing toward goals 
[]     Improving slowly and progressing toward goals 
[]     Not making progress toward goals and plan of care will be adjusted PLAN OF CARE:  
Patient continues to benefit from skilled intervention to address the above impairments. [x]    Continue treatment per established plan of care. []     Recommend the following changes to the plan of care:  
 
Recommendations/Intent for next treatment: Advance exercises, IASTM trial,  Advance UBE. Antonia Berger, PT Time Calculation: 50 mins Patient Time in clinic: 
 Start Time: 1400 Stop Time: 8846

## 2018-12-03 ENCOUNTER — HOSPITAL ENCOUNTER (OUTPATIENT)
Dept: PHYSICAL THERAPY | Age: 28
Discharge: HOME | End: 2018-12-03
Payer: COMMERCIAL

## 2018-12-03 NOTE — PROGRESS NOTES
[address] OUTPATIENT physical Therapy 12/3/2018: 
Justus Chakraborty was not seen on this date for physical therapy for the following reasons: 
 
[x]     Patient called to cancel the visit for the following reasons: conflict 
[]     Patient missed the visit and did not call to cancel.  
 
Jyoti Quispe, PT

## 2018-12-05 ENCOUNTER — HOSPITAL ENCOUNTER (OUTPATIENT)
Dept: PHYSICAL THERAPY | Age: 28
Discharge: HOME | End: 2018-12-05
Payer: COMMERCIAL

## 2018-12-05 PROCEDURE — 97110 THERAPEUTIC EXERCISES: CPT

## 2018-12-05 PROCEDURE — 97140 MANUAL THERAPY 1/> REGIONS: CPT

## 2018-12-05 NOTE — PROGRESS NOTES
[address] OUTPATIENT physical Therapy DAILY Treatment NOTe Visit: 4 NAME: Nadira De La O AGE: 34 y.o. GENDER: female DATE: 12/5/2018 REFERRING PHYSICIAN: Letty Morales MD 
 
 
 
 
GOALS Short term goals Time frame: 3 weeks 1. Patient will be compliance and independent with the initial HEP as evidenced by being able to perform without cuing. 2. Patient will report a 50% improvement in symptoms. 3. Patient report a 50% improvement in sleeping. 4. Patient will tolerate 15 minutes of clinic activities before onset of symptoms. Long term goals Time frame: 6 weeks 1. Patient will reports pain level decreased to 2/10 to allow increased ease of movement. 2. Patient will have an improved score on the TXU Marquita  outcome measure by 10 points to demonstrate an increase in functional activity tolerance. 3. Patient will be independent in final individualized HEP. 4. Patient will return to full work duties without being limited by symptoms. 5. Patient will sleep 6-8 hours without being interrupted by pain. SUBJECTIVE:  
Pain right shoulder hurts worse at night Sleeps on left side, Stiff in the AM. 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 Pt no longer pregnant Pain In: 9/10 OBJECTIVE DATA SUMMARY:  
 
Pain: 
Location: Right shoulder and neck, shoulder blade Quality: aching and burning Now:7/10 Best: Worst:8/10 Factors that improve pain: heat Posture: WNL Strength:  
Right UE WNL Range of Motion:  
Right UE ROM WNL, pain at end range around shoulder blade Spinal Assessment:  
WNL Joint Mobility:  
Thoracic spine, hypomobile. Cx distraction and Cx distraction with rotation decrease pain. Palpation:  
TTP right UT, levator, rhomboids and over tx spinous processes. Neurologic Assessment: 
 Tone: WNL Sensation: normal 
 Reflexes: not tested Special Tests: - Spurlings Mobility: 
 Transitional Movements: WNL  Gait: WNL Balance: WNL Functional Measure: In compliance with CMSs Claims Based Outcome Reporting, the following G-code set was chosen for this patient based on their primary functional limitation being treated: The outcome measure chosen to determine the severity of the functional limitation was the TXU Marquita with a score of 14/24 which was correlated with the impairment scale. ? Mobility - Walking and Moving Around:  
  - CURRENT STATUS: CK - 40%-59% impaired, limited or restricted  - GOAL STATUS: CI - 1%-19% impaired, limited or restricted  - D/C STATUS:  ---------------To be determined--------------- Physical Therapy Evaluation Charge Determination History Examination Presentation Decision-Making LOW Complexity : Zero comorbidities / personal factors that will impact the outcome / POC LOW Complexity : 1-2 Standardized tests and measures addressing body structure, function, activity limitation and / or participation in recreation  LOW Complexity : Stable, uncomplicated  LOW Complexity : FOTO score of  Based on the above components, the patient evaluation is determined to be of the following complexity level: LOW  
 
TREATMENT/INTERVENTION:   
Modalities (Rationale): MHP post treatment to decrease pain and musscle guarding, TENS trial 
 
 
Therapeutic Exercises: HEP  Supine over vertical towel roll for thoracic mob, shoulder blade squeeze, UT stretch, Levator stretch, Angry Cat, child's pose, thoracic side bend. Clinic Activities Seated UT stretch 3 reps 15 sec hold Levator stretch 3 reps 1 5 sec hold Upper thoracic/lower cervical stretch 4 reps 20 sec hold Flexion/ext over back of chair with vertical roll 10 reps Rows seated on ball green TB x 15 reps Standing Rows with green TB 15 reps 2 sets Pull downs with green TB 15 reps 2 sets Wall push ups x 15 reps UBE/LBE 6 minutes 3 resistance All 4's Angry Cat 7 reps Child's pose 3 reps with 20 sec hold UE extension with 2# wt 5 reps b/L, Prone Superman lift 5 reps with 5 sec hold Prone press ups x 10 reps Shoulder ext, shoulder blade squeeze with 2# x 10 reps B Supine Stretch over blue foam with shoulder flex 2# x 10 reps B/L Punches to the ceiling 3# x 10 reps B Manual Therapy: 
Grade 2 prone mob of thoracic spine, painful TTP right rhomboids,right thoracic paraspinals UT and levator Educated on use of tennis ball for self STM. Neuro Re-Education: 
Discussed using lumbar support to improve head and neck alignment Balance Training: 
none Ambulation/Gait Training: 
none Activity tolerance and post treatment pain report:  
Good tolerance Pain Out:  
 
Education: 
Education was provided to the patient on the following topics: Pt reports that she went to the gym and lifted weights (up to 15#) and her shoulder hurt afterwards. Instructed pt not to lift wts at the gym at this point in her recovery. Lynette Britton [x]    No changes were made to the home exercise program. 
[]    The following changes were made to the home exercise program:  
Patient verbalized understanding of the topics presented. ASSESSMENT:  
Pt reports she is no longer pregnant. Continued pain between shoulder blade and spine on right as well as right neck, 7/10 . Lynette Britton Tolerated all exercises well, remains TTP in right rhomboids, thoracic paraspinals, UT and levator. Advanced scapular stabilization exercises as above, with good tolerance. Encouraged her to use tennis ball for STM. Assess response to TENS trial.   
Patients progression toward goals is as follows: 
[x]     Improving appropriately and progressing toward goals 
[]     Improving slowly and progressing toward goals 
[]     Not making progress toward goals and plan of care will be adjusted PLAN OF CARE:  
 Patient continues to benefit from skilled intervention to address the above impairments. [x]    Continue treatment per established plan of care. []     Recommend the following changes to the plan of care:  
 
Recommendations/Intent for next treatment: Advance exercises,  Advance UBE. Assess response to TENS, ES? IASTM. Susan Munoz, PT Time Calculation: 50 mins Patient Time in clinic: 
 Start Time: 56 Stop Time: [phone]

## 2018-12-10 ENCOUNTER — HOSPITAL ENCOUNTER (OUTPATIENT)
Dept: PHYSICAL THERAPY | Age: 28
Discharge: HOME | End: 2018-12-10
Payer: COMMERCIAL

## 2018-12-10 NOTE — PROGRESS NOTES
[address] OUTPATIENT physical Therapy 12/10/2018: 
Glenna Peace was not seen on this date for physical therapy for the following reasons: 
 
[x]     Patient called to cancel the visit for the following reasons: weather 
[]     Patient missed the visit and did not call to cancel.  
 
Stevenson Rodriguez, PT

## 2018-12-30 ENCOUNTER — HOSPITAL ENCOUNTER (EMERGENCY)
Age: 28
Discharge: HOME | End: 2018-12-30
Attending: EMERGENCY MEDICINE
Payer: COMMERCIAL

## 2018-12-30 VITALS
WEIGHT: 150 LBS | RESPIRATION RATE: 20 BRPM | TEMPERATURE: 98.4 F | BODY MASS INDEX: 24.11 KG/M2 | HEIGHT: 66 IN | SYSTOLIC BLOOD PRESSURE: 148 MMHG | DIASTOLIC BLOOD PRESSURE: 95 MMHG | HEART RATE: 95 BPM | OXYGEN SATURATION: 99 %

## 2018-12-30 DIAGNOSIS — J02.9 ACUTE PHARYNGITIS, UNSPECIFIED ETIOLOGY: Primary | ICD-10-CM

## 2018-12-30 LAB
DEPRECATED S PYO AG THROAT QL EIA: NEGATIVE
FLUAV AG NPH QL IA: NEGATIVE
FLUBV AG NOSE QL IA: NEGATIVE

## 2018-12-30 PROCEDURE — 87880 STREP A ASSAY W/OPTIC: CPT

## 2018-12-30 PROCEDURE — 99282 EMERGENCY DEPT VISIT SF MDM: CPT

## 2018-12-30 PROCEDURE — 87804 INFLUENZA ASSAY W/OPTIC: CPT

## 2018-12-30 PROCEDURE — 87070 CULTURE OTHR SPECIMN AEROBIC: CPT

## 2018-12-30 RX ORDER — LIDOCAINE HYDROCHLORIDE 20 MG/ML
SOLUTION OROPHARYNGEAL
Qty: 1 BOTTLE | Refills: 0 | Status: SHIPPED | OUTPATIENT
Start: 2018-12-30 | End: 2019-01-15

## 2018-12-31 NOTE — ED NOTES
Pt arrived to ED via ambulatory with c/o sore throat x 2 days. Pt. Denies nausea, vomiting, diarrhea, fever and chills. Lungs clear. Pt is in no acute distress. Will continue to monitor. See nursing assessment. Safety precautions in place; call light within reach. Emergency Department Nursing Plan of Care The Nursing Plan of Care is developed from the Nursing assessment and Emergency Department Attending provider initial evaluation. The plan of care may be reviewed in the ED Provider note. The Plan of Care was developed with the following considerations:  
Patient / Family readiness to learn indicated by:verbalized understanding Persons(s) to be included in education: patient Barriers to Learning/Limitations:No 
 
Signed Gt Martinez RN   
12/30/2018   11:14 PM

## 2018-12-31 NOTE — DISCHARGE INSTRUCTIONS
Sore Throat: Care Instructions  Your Care Instructions    Infection by bacteria or a virus causes most sore throats. Cigarette smoke, dry air, air pollution, allergies, and yelling can also cause a sore throat. Sore throats can be painful and annoying. Fortunately, most sore throats go away on their own. If you have a bacterial infection, your doctor may prescribe antibiotics. Follow-up care is a key part of your treatment and safety. Be sure to make and go to all appointments, and call your doctor if you are having problems. It's also a good idea to know your test results and keep a list of the medicines you take. How can you care for yourself at home? · If your doctor prescribed antibiotics, take them as directed. Do not stop taking them just because you feel better. You need to take the full course of antibiotics. · Gargle with warm salt water once an hour to help reduce swelling and relieve discomfort. Use 1 teaspoon of salt mixed in 1 cup of warm water. · Take an over-the-counter pain medicine, such as acetaminophen (Tylenol), ibuprofen (Advil, Motrin), or naproxen (Aleve). Read and follow all instructions on the label. · Be careful when taking over-the-counter cold or flu medicines and Tylenol at the same time. Many of these medicines have acetaminophen, which is Tylenol. Read the labels to make sure that you are not taking more than the recommended dose. Too much acetaminophen (Tylenol) can be harmful. · Drink plenty of fluids. Fluids may help soothe an irritated throat. Hot fluids, such as tea or soup, may help decrease throat pain. · Use over-the-counter throat lozenges to soothe pain. Regular cough drops or hard candy may also help. These should not be given to young children because of the risk of choking. · Do not smoke or allow others to smoke around you. If you need help quitting, talk to your doctor about stop-smoking programs and medicines.  These can increase your chances of quitting for good. · Use a vaporizer or humidifier to add moisture to your bedroom. Follow the directions for cleaning the machine. When should you call for help? Call your doctor now or seek immediate medical care if:    · You have new or worse trouble swallowing.     · Your sore throat gets much worse on one side.    Watch closely for changes in your health, and be sure to contact your doctor if you do not get better as expected. Where can you learn more? Go to http://isabel-shabbir.info/. Enter [phone] [id number] in the search box to learn more about \"Sore Throat: Care Instructions. \"  Current as of: March 28, 2018  Content Version: 11.8  © 5960-1808 Healthwise[employer]. Care instructions adapted under license by [employer] (which disclaims liability or warranty for this information). If you have questions about a medical condition or this instruction, always ask your healthcare professional. [address] any warranty or liability for your use of this information.

## 2018-12-31 NOTE — ED TRIAGE NOTES
Pt reporting sore throat x 2 days with dry, unproductive cough.  Pt reports she thinks she has strep and would like a strep test.

## 2018-12-31 NOTE — ED PROVIDER NOTES
EMERGENCY DEPARTMENT HISTORY AND PHYSICAL EXAM 
 
Date: 12/30/2018 Patient Name: Nenita Montes History of Presenting Illness Chief Complaint Patient presents with  Sore Throat  
  x 2 days History Provided By: Patient HPI: Nenita Montes is a 34 y.o. female with a PMH of CKD, chronic pain who presents with cc of sore throat X 2 days. Pt states she had cold like symptoms a few days ago and has been taking otc cold medication which has not resolved her symptoms. She specifically denies any fevers, chills, nausea, vomiting, chest pain, shortness of breath, headache, rash, diarrhea, sweating or weight loss. All other ROS negative at this time Pt is in no acute distress and is speaking in full sentences PCP: Bárbara Singleton MD 
 
Current Outpatient Medications Medication Sig Dispense Refill  lidocaine (LIDOCAINE VISCOUS) 2 % solution Take 15 ml by mouth gargle and spit out, three times a day as needed for pain 1 Bottle 0  
  mg tablet TAKE ONE TO TWO TABLETS BY MOUTH EVERY 8 HOURS AS NEEDED FOR PAIN WITH FOOD 60 Tab 0  
 methocarbamol (ROBAXIN-750) 750 mg tablet Take 1 Tab by mouth four (4) times daily. 20 Tab 0  
 lidocaine (LIDODERM) 5 % Apply patch to the affected area for 12 hours a day and remove for 12 hours a day. 1 Each 0 Past History Past Medical History: 
Past Medical History:  
Diagnosis Date  Abnormal Pap smear  Adjustment disorder with mixed anxiety and depressed mood 10/24/2018  Anemia  Chronic kidney disease  Chronic pain   
 abdominal,lower right flank  Hydronephrosis, right 8/22/2014 Past Surgical History: 
Past Surgical History:  
Procedure Laterality Date  HX GYN    
 vaginal birth x 2  
 HX OTHER SURGICAL  2016 August   
 urethral stent  HX OTHER SURGICAL  03/2017  
 urethral stents replaced multiple times, last placed right urethal stent in march 2017  HX UROLOGICAL    
 stent placements Family History: 
Family History Problem Relation Age of Onset  Elevated Lipids Mother  Hypertension Mother  Cancer Paternal Aunt  Cancer Paternal Uncle  Arthritis-osteo Maternal Grandmother  Diabetes Maternal Grandmother  Cancer Paternal Grandmother  Diabetes Paternal Grandmother Social History: 
Social History Tobacco Use  Smoking status: Former Smoker Years: 2.00  Smokeless tobacco: Never Used Substance Use Topics  Alcohol use: Yes Alcohol/week: 3.6 oz Types: 5 Glasses of wine, 1 Standard drinks or equivalent per week Comment: ocassionally  Drug use: No  
  Comment: last used 1 week ago Allergies: 
No Known Allergies Review of Systems Review of Systems Constitutional: Negative. Negative for chills and fever. HENT: Positive for congestion, rhinorrhea and sore throat. Eyes: Negative. Respiratory: Positive for cough. Negative for shortness of breath. Cardiovascular: Negative. Negative for chest pain. Gastrointestinal: Negative. Negative for abdominal pain, diarrhea, nausea and vomiting. Endocrine: Negative. Genitourinary: Negative. Musculoskeletal: Negative. Skin: Negative. Allergic/Immunologic: Negative. Neurological: Negative. Negative for headaches. Hematological: Negative. Psychiatric/Behavioral: Negative. All other systems reviewed and are negative. Physical Exam  
 
Vitals:  
 12/30/18 2221 BP: (!) 148/95 Pulse: 95 Resp: 20 Temp: 98.4 °F (36.9 °C) SpO2: 99% Weight: 68 kg (150 lb) Height: 5' 6\" (1.676 m) Physical Exam  
Constitutional: She is oriented to person, place, and time. She appears well-developed and well-nourished. No distress. HENT:  
Head: Normocephalic and atraumatic.   
Right Ear: External ear normal.  
Left Ear: External ear normal.  
Nose: Nose normal.  
Mouth/Throat: Uvula is midline and mucous membranes are normal. Posterior oropharyngeal erythema present. No oropharyngeal exudate, posterior oropharyngeal edema or tonsillar abscesses. Eyes: Conjunctivae and EOM are normal. Pupils are equal, round, and reactive to light. Neck: Normal range of motion. Neck supple. No tracheal deviation present. Cardiovascular: Normal rate, regular rhythm, normal heart sounds and intact distal pulses. Pulmonary/Chest: Effort normal and breath sounds normal. No respiratory distress. She has no wheezes. Abdominal: Soft. Bowel sounds are normal. She exhibits no distension. There is no tenderness. There is no rebound, no CVA tenderness, no tenderness at McBurney's point and negative Peralta's sign. Musculoskeletal: Normal range of motion. She exhibits no edema, tenderness or deformity. Lymphadenopathy:  
  She has no cervical adenopathy. Neurological: She is alert and oriented to person, place, and time. She has normal reflexes. She displays normal reflexes. No cranial nerve deficit. She exhibits normal muscle tone. Coordination normal.  
Skin: Skin is warm and dry. She is not diaphoretic. No pallor. Psychiatric: She has a normal mood and affect. Her behavior is normal. Judgment and thought content normal.  
Nursing note and vitals reviewed. Diagnostic Study Results Labs - No results found for this or any previous visit (from the past 12 hour(s)). Radiologic Studies - No orders to display CT Results  (Last 48 hours) None CXR Results  (Last 48 hours) None Medical Decision Making I am the first provider for this patient. I reviewed the vital signs, available nursing notes, past medical history, past surgical history, family history and social history. Vital Signs-Reviewed the patient's vital signs. Records Reviewed: Nursing Notes, Old Medical Records, Previous Radiology Studies and Previous Laboratory Studies Disposition: 
Discharge DISCHARGE NOTE:  
 Care plan outlined and precautions discussed. Patient has no new complaints, changes, or physical findings. Results of visit were reviewed with the patient. All medications were reviewed with the patient; will d/c home. All of pt's questions and concerns were addressed. Patient was instructed and agrees to follow up with pcp, as well as to return to the ED upon further deterioration. Patient is ready to go home. Follow-up Information None Current Discharge Medication List  
  
START taking these medications Details  
lidocaine (LIDOCAINE VISCOUS) 2 % solution Take 15 ml by mouth gargle and spit out, three times a day as needed for pain 
Qty: 1 Bottle, Refills: 0 Provider Notes (Medical Decision Making): The patient complains of sore throat. Has non-productive cough without dyspnea or wheezing. Symptoms are consistent with an uncomplicated viral pharyngitis. DDx: strep throat, candidal infection. No evidence of peritonsillar abscess or retropharyngeal abscess. Will treat with supportive care such as fluids, chloroseptic spray, analgesics. Lack of antibiotic effectiveness discussed with her. Symptomatic therapy suggested: gargle for sore throat, use mist at bedside for congestion. Apply facial warm packs for sinus pain or use nasal saline sprays. Follow up prn if not better in 72 hours. Procedures: 
Procedures Diagnosis Clinical Impression: 1. Acute pharyngitis, unspecified etiology

## 2019-01-02 LAB
BACTERIA SPEC CULT: NORMAL
SERVICE CMNT-IMP: NORMAL

## 2019-01-15 ENCOUNTER — OFFICE VISIT (OUTPATIENT)
Dept: INTERNAL MEDICINE CLINIC | Age: 29
End: 2019-01-15

## 2019-01-15 VITALS
HEIGHT: 66 IN | WEIGHT: 164.5 LBS | SYSTOLIC BLOOD PRESSURE: 141 MMHG | BODY MASS INDEX: 26.44 KG/M2 | HEART RATE: 76 BPM | TEMPERATURE: 98.2 F | DIASTOLIC BLOOD PRESSURE: 95 MMHG | OXYGEN SATURATION: 98 % | RESPIRATION RATE: 18 BRPM

## 2019-01-15 DIAGNOSIS — G56.91 NEUROPATHY OF FINGER OF RIGHT HAND: Primary | ICD-10-CM

## 2019-01-15 NOTE — PATIENT INSTRUCTIONS
Carpal Tunnel Syndrome: Care Instructions  Your Care Instructions    Carpal tunnel syndrome is a nerve problem. It can cause tingling, numbness, weakness, or pain in the fingers, thumb, and hand. The median nerve and several tough tissues called tendons run through a space in the wrist called the carpal tunnel. The repeated hand motions used in work and some hobbies and sports can put pressure on the nerve. Pregnancy and several conditions, including diabetes, arthritis, and an underactive thyroid, also can cause carpal tunnel syndrome. You may be able to limit an activity or do it differently to reduce your symptoms. You also can take other steps to feel better. If your symptoms are mild, 1 to 2 weeks of home treatment are likely to ease your pain. Surgery is needed only if other treatments do not work. Follow-up care is a key part of your treatment and safety. Be sure to make and go to all appointments, and call your doctor if you are having problems. It's also a good idea to know your test results and keep a list of the medicines you take. How can you care for yourself at home? · If possible, stop or reduce the activity that causes your symptoms. If you cannot stop the activity, take frequent breaks to rest and stretch or change hand positions to do a task. Try switching hands, such as when using a computer mouse. · Try to avoid bending or twisting your wrists. · Ask your doctor if you can take an over-the-counter pain medicine, such as acetaminophen (Tylenol), ibuprofen (Advil, Motrin), or naproxen (Aleve). Be safe with medicines. Read and follow all instructions on the label. · If your doctor prescribes corticosteroid medicine to help reduce pain and swelling, take it exactly as prescribed. Call your doctor if you think you are having a problem with your medicine. · Put ice or a cold pack on your wrist for 10 to 20 minutes at a time to ease pain.  Put a thin cloth between the ice and your skin.  · If your doctor or your physical or occupational therapist tells you to wear a wrist splint, wear it as directed to keep your wrist in a neutral position. This also eases pressure on your median nerve. · Ask your doctor whether you should have physical or occupational therapy to learn how to do tasks differently. · Try a yoga class to stretch your muscles and build strength in your hands and wrists. Yoga has been shown to ease carpal tunnel symptoms. To prevent carpal tunnel  · When working at a computer, keep your hands and wrists in line with your forearms. Hold your elbows close to your sides. Take a break every 10 to 15 minutes. · Try these exercises:  ? Warm up: Rotate your wrist up, down, and from side to side. Repeat this 4 times. Stretch your fingers far apart, relax them, then stretch them again. Repeat 4 times. Stretch your thumb by pulling it back gently, holding it, and then releasing it. Repeat 4 times. ? Prayer stretch: Start with your palms together in front of your chest just below your chin. Slowly lower your hands toward your waistline while keeping your hands close to your stomach and your palms together until you feel a mild to moderate stretch under your forearms. Hold for 10 to 20 seconds. Repeat 4 times. ? Wrist flexor stretch: Hold your arm in front of you with your palm up. Bend your wrist, pointing your hand toward the floor. With your other hand, gently bend your wrist further until you feel a mild to moderate stretch in your forearm. Hold for 10 to 20 seconds. Repeat 4 times. ? Wrist extensor stretch: Repeat the steps for the wrist flexor stretch, but begin with your extended hand palm down. · Squeeze a rubber exercise ball several times a day to keep your hands and fingers strong. · Avoid holding objects (such as a book) in one position for a long time. When possible, use your whole hand to grasp an object.  Using just the thumb and index finger can put stress on the wrist.  · Do not smoke. It can make this condition worse by reducing blood flow to the median nerve. If you need help quitting, talk to your doctor about stop-smoking programs and medicines. These can increase your chances of quitting for good. When should you call for help? Watch closely for changes in your health, and be sure to contact your doctor if:    · Your pain or other problems do not get better with home care.     · You want more information about physical or occupational therapy.     · You have side effects of your corticosteroid medicine, such as:  ? Weight gain. ? Mood changes. ? Trouble sleeping. ? Bruising easily.     · You have any other problems with your medicine. Where can you learn more? Go to http://isabel-shabbir.info/. Enter R432 in the search box to learn more about \"Carpal Tunnel Syndrome: Care Instructions. \"  Current as of: November 29, 2017  Content Version: 11.8  © 0843-7078 [employer]wise, [employer]. Care instructions adapted under license by [employer] (which disclaims liability or warranty for this information). If you have questions about a medical condition or this instruction, always ask your healthcare professional. [place] [address] any warranty or liability for your use of this information.

## 2019-01-15 NOTE — PROGRESS NOTES
Chief Complaint   Patient presents with    Hand Pain     right numbness at night for 3 months, consistantly now for 3 weeks     1. Have you been to the ER, urgent care clinic since your last visit? Hospitalized since your last visit? No    2. Have you seen or consulted any other health care providers outside of the [facility] since your last visit? Include any pap smears or colon screening.  No

## 2019-01-15 NOTE — PROGRESS NOTES
Joshua Redd is a 34 y.o. female and presents with Hand Pain (right numbness at night for 3 months, consistantly now for 3 weeks)  . Subjective:  Pt comes-in w ~ 3 week c/o rt hand and forearm numbness. Pt relays she feels her sxs WORSE at night, but has been occurring more frequently during the dayy. Pt has never been dx'ed w  CTS. Pt has been using ibuprofen w/o relief. Pt denies weakness/fever/rash/trauma. Pt does not perform repetitive mvmnt at her job. Review of Systems  Review of systems (12) negative, except noted above. Past Medical History:   Diagnosis Date    Abnormal Pap smear     Adjustment disorder with mixed anxiety and depressed mood 10/24/2018    Anemia     Chronic kidney disease     Chronic pain     abdominal,lower right flank    Hydronephrosis, right 8/22/2014     Past Surgical History:   Procedure Laterality Date    HX GYN      vaginal birth x 2    HX OTHER SURGICAL  2016 August     urethral stent    HX OTHER SURGICAL  03/2017    urethral stents replaced multiple times, last placed right urethal stent in march 2017    HX UROLOGICAL      stent placements     Social History     Socioeconomic History    Marital status: SINGLE     Spouse name: Not on file    Number of children: Not on file    Years of education: Not on file    Highest education level: Not on file   Tobacco Use    Smoking status: Current Some Day Smoker     Years: 2.00    Smokeless tobacco: Never Used    Tobacco comment: socially   Substance and Sexual Activity    Alcohol use:  Yes     Alcohol/week: 3.6 oz     Types: 5 Glasses of wine, 1 Standard drinks or equivalent per week     Comment: ocassionally    Drug use: No     Comment: last used 1 week ago    Sexual activity: Yes     Partners: Male     Birth control/protection: None     Family History   Problem Relation Age of Onset    Elevated Lipids Mother     Hypertension Mother     Cancer Paternal Aunt     Cancer Paternal Uncle     Arthritis-osteo Maternal Grandmother     Diabetes Maternal Grandmother     Cancer Paternal Grandmother     Diabetes Paternal Grandmother      Current Outpatient Medications   Medication Sig Dispense Refill    Arm Brace (NEOPRENE WRIST SPLINT SUPPORT) misc Use on right hand 1 Each 0     No Known Allergies    Objective:  Visit Vitals  BP (!) 141/95 (BP 1 Location: Left arm, BP Patient Position: Sitting)   Pulse 76   Temp 98.2 °F (36.8 °C) (Oral)   Resp 18   Ht 5' 6\" (1.676 m)   Wt 164 lb 8 oz (74.6 kg)   LMP 01/13/2019 (Exact Date)   SpO2 98%   BMI 26.55 kg/m²     Physical Exam:   General appearance - alert, well appearing, and in no distress. Pleasant  Mental status - alert, oriented to person, place, and time  EYE-EOMI  Neck - supple, no significant adenopathy   Chest - clear to auscultation, no wheezes, rales or rhonchi, symmetric air entry   Heart - normal rate, regular rhythm, normal S1, S2, no murmurs, rubs, clicks or gallops   Back + upper back tenderness over rt trapezius distribution  Ext-peripheral pulses normal, no pedal edema, no clubbing or cyanosis  Skin-Warm and dry. no hyperpigmentation, vitiligo, or suspicious lesions  Neuro -alert, oriented, normal speech, no focal findings or movement disorder noted      Results for orders placed or performed during the hospital encounter of 12/30/18   STREP AG SCREEN, GROUP A   Result Value Ref Range    Group A Strep Ag ID NEGATIVE  NEG     INFLUENZA A & B AG (RAPID TEST)   Result Value Ref Range    Influenza A Antigen NEGATIVE  NEG      Influenza B Antigen NEGATIVE  NEG     CULTURE, THROAT   Result Value Ref Range    Special Requests: NO SPECIAL REQUESTS      Culture result: NORMAL RESPIRATORY WALLY/NO BETA STREP ISOLATED         Assessment/Plan:    ICD-10-CM ICD-9-CM    1.  Neuropathy of finger of right hand G56.91 354.9 Arm Brace (NEOPRENE WRIST SPLINT SUPPORT) misc      EMG TWO EXTREMITIES UPPER     Orders Placed This Encounter    EMG TWO EXTREMITIES UPPER     RUE numbness r/o CTS     Standing Status:   Future     Standing Expiration Date:   7/15/2019     Order Specific Question:   Reason for Exam:     Answer:   r/o CTS RUE    Arm Brace (NEOPRENE WRIST SPLINT SUPPORT) misc     Sig: Use on right hand     Dispense:  1 Each     Refill:  0     1. Neuropathy of finger of right hand  - cont NSAIDS prn  - Arm Brace (NEOPRENE WRIST SPLINT SUPPORT) misc; Use on right hand  Dispense: 1 Each; Refill: 0  - EMG TWO EXTREMITIES UPPER; Future    Pt may need ortho eval if conservative tx does not improve sxs    There are no Patient Instructions on file for this visit. Follow-up Disposition: Not on File      I have reviewed with the patient details of the assessment and plan and all questions were answered. Relevent patient education was performed. The most recent lab findings were reviewed with the patient. An After Visit Summary was printed and given to the patient.

## 2019-02-01 ENCOUNTER — OFFICE VISIT (OUTPATIENT)
Dept: INTERNAL MEDICINE CLINIC | Age: 29
End: 2019-02-01

## 2019-02-01 VITALS
BODY MASS INDEX: 26.52 KG/M2 | SYSTOLIC BLOOD PRESSURE: 150 MMHG | HEART RATE: 80 BPM | DIASTOLIC BLOOD PRESSURE: 84 MMHG | RESPIRATION RATE: 19 BRPM | OXYGEN SATURATION: 97 % | TEMPERATURE: 98.2 F | WEIGHT: 165 LBS | HEIGHT: 66 IN

## 2019-02-01 DIAGNOSIS — M25.562 ACUTE PAIN OF LEFT KNEE: ICD-10-CM

## 2019-02-01 DIAGNOSIS — S80.212A ABRASION OF LEFT KNEE, INITIAL ENCOUNTER: Primary | ICD-10-CM

## 2019-02-01 RX ORDER — MUPIROCIN 20 MG/G
OINTMENT TOPICAL DAILY
Qty: 22 G | Refills: 0 | Status: SHIPPED | OUTPATIENT
Start: 2019-02-01 | End: 2019-07-03

## 2019-02-01 NOTE — PROGRESS NOTES
Luis Lopez is a 34 y.o. female and presents with Knee Injury (about a week ago)  . Subjective:  Pt comes-in w c/o knee abrasion. She would like to r/o infection. Pt fell and scraped her knee. She has been using alcohol to clean it daily. Pt admits to drinking alcohol prior to fall. No fever/purulent discharge. Pt requests pain med and x-ray  for knee pain. Pt has been taking ibuprofen w/o relief. Review of Systems  Review of systems (12) negative, except noted above. Past Medical History:   Diagnosis Date    Abnormal Pap smear     Adjustment disorder with mixed anxiety and depressed mood 10/24/2018    Anemia     Chronic kidney disease     Chronic pain     abdominal,lower right flank    Hydronephrosis, right 8/22/2014     Past Surgical History:   Procedure Laterality Date    HX GYN      vaginal birth x 2    HX OTHER SURGICAL  2016 August     urethral stent    HX OTHER SURGICAL  03/2017    urethral stents replaced multiple times, last placed right urethal stent in march 2017    HX UROLOGICAL      stent placements     Social History     Socioeconomic History    Marital status: SINGLE     Spouse name: Not on file    Number of children: Not on file    Years of education: Not on file    Highest education level: Not on file   Tobacco Use    Smoking status: Current Some Day Smoker     Years: 2.00    Smokeless tobacco: Never Used    Tobacco comment: socially   Substance and Sexual Activity    Alcohol use:  Yes     Alcohol/week: 3.6 oz     Types: 5 Glasses of wine, 1 Standard drinks or equivalent per week     Comment: ocassionally    Drug use: No     Comment: last used 1 week ago    Sexual activity: Yes     Partners: Male     Birth control/protection: None     Family History   Problem Relation Age of Onset    Elevated Lipids Mother     Hypertension Mother     Cancer Paternal Aunt     Cancer Paternal Uncle     Arthritis-osteo Maternal Grandmother     Diabetes Maternal Grandmother     Cancer Paternal Grandmother     Diabetes Paternal Grandmother      Current Outpatient Medications   Medication Sig Dispense Refill    mupirocin (BACTROBAN) 2 % ointment Apply  to affected area daily. 22 g 0    Arm Brace (NEOPRENE WRIST SPLINT SUPPORT) misc Use on right hand 1 Each 0     No Known Allergies    Objective:  Visit Vitals  /84 (BP 1 Location: Left arm, BP Patient Position: Sitting)   Pulse 80   Temp 98.2 °F (36.8 °C) (Oral)   Resp 19   Ht 5' 6\" (1.676 m)   Wt 165 lb (74.8 kg)   LMP 01/13/2019 (Exact Date)   SpO2 97%   BMI 26.63 kg/m²     Physical Exam:   General appearance - alert, well appearing, and in no distress. Pleasant  Mental status - alert, oriented to person, place, and time  EYE-EOMI  Neck - supple,   Chest - symmetric air entry    Abdomen - obese  Ext-no pedal edema, no clubbing or cyanosis  Skin-Warm and dry. Abrasion left anterior knee ~ 2x3cm w some granulation tissue superficially  Neuro -alert, oriented, normal speech, no focal findings or movement disorder noted        Results for orders placed or performed during the hospital encounter of 12/30/18   STREP AG SCREEN, GROUP A   Result Value Ref Range    Group A Strep Ag ID NEGATIVE  NEG     INFLUENZA A & B AG (RAPID TEST)   Result Value Ref Range    Influenza A Antigen NEGATIVE  NEG      Influenza B Antigen NEGATIVE  NEG     CULTURE, THROAT   Result Value Ref Range    Special Requests: NO SPECIAL REQUESTS      Culture result: NORMAL RESPIRATORY WALLY/NO BETA STREP ISOLATED         Assessment/Plan:    ICD-10-CM ICD-9-CM    1.  Abrasion of left knee, initial encounter S80.212A 916.0 mupirocin (BACTROBAN) 2 % ointment      XR KNEE LT MAX 2 VWS   2. Acute pain of left knee M25.562 719.46 mupirocin (BACTROBAN) 2 % ointment      CANCELED: XR KNEE RT MAX 2 VWS     Orders Placed This Encounter    XR KNEE LT MAX 2 VWS     Standing Status:   Future     Standing Expiration Date:   3/1/2019     Order Specific Question:   Reason for Exam Answer:   s/p fall. Pain     Order Specific Question:   Is Patient Pregnant? Answer:   No    mupirocin (BACTROBAN) 2 % ointment     Sig: Apply  to affected area daily. Dispense:  22 g     Refill:  0     1. Abrasion of left knee, initial encounter  No sign infection  Stop alcohol application    2. Acute pain of left knee  reassurance  - XR KNEE LT MAX 2 VWS; Future  - mupirocin (BACTROBAN) 2 % ointment; Apply  to affected area daily. Dispense: 22 g; Refill: 0      Pt may need ortho eval if conservative tx does not improve sxs    There are no Patient Instructions on file for this visit. Follow-up Disposition: Not on File      I have reviewed with the patient details of the assessment and plan and all questions were answered. Relevent patient education was performed. The most recent lab findings were reviewed with the patient. An After Visit Summary was printed and given to the patient.

## 2019-02-01 NOTE — PROGRESS NOTES
Chief Complaint   Patient presents with    Knee Injury     about a week ago     1. Have you been to the ER, urgent care clinic since your last visit? Hospitalized since your last visit? Yes When: [facility] about a week ago for l knee injury    2. Have you seen or consulted any other health care providers outside of the [address] [place] [address] since your last visit? Include any pap smears or colon screening.  No

## 2019-02-04 NOTE — PROGRESS NOTES
[address] OUTPATIENT physical Therapy discharge note 2/4/2019: 
Patient will be discharged from physical therapy at this time. Criteria for termination of care: 
 
[]           Patient has plateaued 
[x]           Patient has not returned to therapy 
[]           Patient has missed three or more visits without prior notification 
[]           Other: 
 
Patient was seen for 4 visits from 10/31/18 to 12/5/18. Please refer to the most recent progress note for the latest PT info available. If you need anything further faxed to you, please contact us at [phone].  
 
Thank you for this referral. 
Salome Gates, PT

## 2019-03-14 ENCOUNTER — HOSPITAL ENCOUNTER (EMERGENCY)
Age: 29
Discharge: HOME | End: 2019-03-14
Attending: EMERGENCY MEDICINE | Admitting: EMERGENCY MEDICINE
Payer: COMMERCIAL

## 2019-03-14 ENCOUNTER — APPOINTMENT (OUTPATIENT)
Dept: CT IMAGING | Age: 29
End: 2019-03-14
Attending: EMERGENCY MEDICINE
Payer: COMMERCIAL

## 2019-03-14 VITALS
DIASTOLIC BLOOD PRESSURE: 77 MMHG | HEIGHT: 66 IN | WEIGHT: 155 LBS | BODY MASS INDEX: 24.91 KG/M2 | TEMPERATURE: 98.4 F | OXYGEN SATURATION: 97 % | RESPIRATION RATE: 18 BRPM | SYSTOLIC BLOOD PRESSURE: 143 MMHG | HEART RATE: 88 BPM

## 2019-03-14 DIAGNOSIS — T40.1X1A HEROIN OVERDOSE, ACCIDENTAL OR UNINTENTIONAL, INITIAL ENCOUNTER (HCC): Primary | ICD-10-CM

## 2019-03-14 DIAGNOSIS — S09.90XA CLOSED HEAD INJURY, INITIAL ENCOUNTER: ICD-10-CM

## 2019-03-14 PROCEDURE — 99283 EMERGENCY DEPT VISIT LOW MDM: CPT

## 2019-03-14 PROCEDURE — 70450 CT HEAD/BRAIN W/O DYE: CPT

## 2019-03-14 PROCEDURE — 74011250637 HC RX REV CODE- 250/637: Performed by: EMERGENCY MEDICINE

## 2019-03-14 RX ORDER — ONDANSETRON 4 MG/1
8 TABLET, ORALLY DISINTEGRATING ORAL
Status: COMPLETED | OUTPATIENT
Start: 2019-03-14 | End: 2019-03-14

## 2019-03-14 RX ORDER — ONDANSETRON 4 MG/1
4 TABLET, ORALLY DISINTEGRATING ORAL
Qty: 10 TAB | Refills: 0 | Status: SHIPPED | OUTPATIENT
Start: 2019-03-14 | End: 2019-07-03

## 2019-03-14 RX ORDER — ACETAMINOPHEN 500 MG
1000 TABLET ORAL ONCE
Status: COMPLETED | OUTPATIENT
Start: 2019-03-14 | End: 2019-03-14

## 2019-03-14 RX ADMIN — ACETAMINOPHEN 1000 MG: 500 TABLET, FILM COATED ORAL at 22:30

## 2019-03-14 RX ADMIN — ONDANSETRON 8 MG: 4 TABLET, ORALLY DISINTEGRATING ORAL at 22:24

## 2019-03-15 NOTE — DISCHARGE INSTRUCTIONS
Patient Education        Opioid Overdose: Care Instructions  Your Care Instructions    You have had treatment to help your body recover from taking too much of an opioid. You are getting better, but you may not feel well for a while. It takes time for the opioids to leave your body. How long it takes to feel better depends on which drug you took and how much you took of it. Opioids include illegal drugs such as heroin, often called smack, junk, H, and ska. Opioids also include medicines that doctors prescribe to treat pain. These are medicines such as oxycodone, methadone, and buprenorphine. They are sometimes sold and used illegally. Taking too much of an opioid can be dangerous. It may cause:  · Trouble breathing. · Low blood pressure. · A low heart rate. · A coma. When the doctor treated you for the overdose, he or she may have:  · Watched your symptoms or done tests to find out what kind of drug you took. · Given you fluids. · Given you oxygen to help you breathe. · Given you a medicine called naloxone to help reverse the effects of the opioid. · Done several tests, including blood tests, to see how you're responding to treatment. The doctor also watched you carefully to make sure you were recovering safely. Follow-up care is a key part of your treatment and safety. Be sure to make and go to all appointments, and call your doctor if you are having problems. It's also a good idea to know your test results and keep a list of the medicines you take. How can you care for yourself at home? · If you take opioids regularly, your body gets used to them. This is called dependency. If you are dependent on this drug, you may have withdrawal symptoms when you stop taking it. These can include nausea, sweating, chills, diarrhea, stomach cramps, and muscle aches. Withdrawal can last up to several weeks, depending on which drug you took and how long you took it.  You may feel very ill, but you are probably not in medical danger. · Your doctor may give you medicine to help you feel better. To help get through withdrawal, you can also:  ? Get plenty of rest.  ? Drink plenty of fluids. ? Stay active, but don't tire yourself. ? Eat a healthy diet. · If you had a tube in your throat to help you breathe, you may have a sore throat or hoarseness that can last a few days. Sip liquids to help soothe your throat. · Do not drink alcohol or take illegal drugs. · Do not take medications that make you tired, like sleeping pills or muscle relaxers. · Do not drive if you feel sleepy or groggy while you recover from your overdose. · Get help to stop using drugs. Talk to your doctor about drug treatment programs. · Talk to your doctor or pharmacist about having a naloxone rescue kit on hand. When should you call for help? Call 911 anytime you think you may need emergency care. For example, call if:    · You feel you cannot stop from hurting yourself or someone else.   [facility] your doctor now or seek immediate medical care if:    · You have new or worse withdrawal symptoms, such as:  ? Stomach cramps. ? Vomiting. ? Diarrhea. ? Muscle aches. ? Sweating.    Watch closely for changes in your health, and be sure to contact your doctor if:    · You do not get better as expected. Where can you learn more? Go to http://isabel-shabbir.info/. Enter [id number] [id number] 278 in the search box to learn more about \"Opioid Overdose: Care Instructions. \"  Current as of: May 7, 2018  Content Version: 11.9  © 5786-1552 [employer]. Care instructions adapted under license by [employer] (which disclaims liability or warranty for this information). If you have questions about a medical condition or this instruction, always ask your healthcare professional. [address] any warranty or liability for your use of this information.          Patient Education        Learning About a Closed Head Injury  What is a closed head injury? A closed head injury happens when your head gets hit hard. The strong force of the blow causes your brain to shake in your skull. This movement can cause the brain to bruise, swell, or tear. Sometimes nerves or blood vessels also get damaged. This can cause bleeding in or around the brain. A concussion is a type of closed head injury. What are the symptoms? If you have a mild concussion, you may have a mild headache or feel \"not quite right. \" These symptoms are common. They usually go away over a few days to 4 weeks. But sometimes after a concussion, you feel like you can't function as well as before the injury. And you have new symptoms. This is called postconcussive syndrome. You may:  · Find it harder to solve problems, think, concentrate, or remember. · Have headaches. · Have changes in your sleep patterns, such as not being able to sleep or sleeping all the time. · Have changes in your personality. · Not be interested in your usual activities. · Feel angry or anxious without a clear reason. · Lose your sense of taste or smell. · Be dizzy, lightheaded, or unsteady. It may be hard to stand or walk. How is a closed head injury treated? Any person who may have a concussion needs to see a doctor. Some people have to stay in the hospital to be watched. Others can go home safely. If you go home, follow your doctor's instructions. He or she will tell you if you need someone to watch you closely for the next 24 hours or longer. Rest is the best treatment. Get plenty of sleep at night. And try to rest during the day. · Avoid activities that are physically or mentally demanding. These include housework, exercise, and schoolwork. And don't play video games, send text messages, or use the computer. You may need to change your school or work schedule to be able to avoid these activities. · Ask your doctor when it's okay to drive, ride a bike, or operate machinery.   · Take an over-the-counter pain medicine, such as acetaminophen (Tylenol), ibuprofen (Advil, Motrin), or naproxen (Aleve). Be safe with medicines. Read and follow all instructions on the label. · Check with your doctor before you use any other medicines for pain. · Do not drink alcohol or use illegal drugs. They can slow recovery. They can also increase your risk of getting a second head injury. Follow-up care is a key part of your treatment and safety. Be sure to make and go to all appointments, and call your doctor if you are having problems. It's also a good idea to know your test results and keep a list of the medicines you take. Where can you learn more? Go to http://isabel-shabbir.info/. Enter E235 in the search box to learn more about \"Learning About a Closed Head Injury. \"  Current as of: Tiffany 3, 2018  Content Version: 11.9  © 5936-7198 [employer], [employer]. Care instructions adapted under license by [employer] (which disclaims liability or warranty for this information). If you have questions about a medical condition or this instruction, always ask your healthcare professional. [place] [address] any warranty or liability for your use of this information.

## 2019-03-15 NOTE — ED NOTES
Discharge instructions were given to the patient by Bharat Piña.     The patient left the Emergency Department ambulatory, alert and oriented and in no acute distress with 1 prescription. The patient was encouraged to call or return to the ED for worsening issues or problems and was encouraged to schedule a follow up appointment for continuing care. The patient verbalized understanding of discharge instructions and prescriptions, all questions were answered. The patient has no further concerns at this time.
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
## 2019-07-03 ENCOUNTER — OFFICE VISIT (OUTPATIENT)
Dept: INTERNAL MEDICINE CLINIC | Age: 29
End: 2019-07-03

## 2019-07-03 VITALS
WEIGHT: 168 LBS | HEIGHT: 66 IN | SYSTOLIC BLOOD PRESSURE: 128 MMHG | OXYGEN SATURATION: 96 % | RESPIRATION RATE: 19 BRPM | HEART RATE: 69 BPM | BODY MASS INDEX: 27 KG/M2 | TEMPERATURE: 98.7 F | DIASTOLIC BLOOD PRESSURE: 67 MMHG

## 2019-07-03 DIAGNOSIS — G56.03 BILATERAL CARPAL TUNNEL SYNDROME: Primary | ICD-10-CM

## 2019-07-03 NOTE — PROGRESS NOTES
Chief Complaint   Patient presents with    Tingling     and burning in bilateral hands x 3 months     1. Have you been to the ER, urgent care clinic since your last visit? Hospitalized since your last visit? No    2. Have you seen or consulted any other health care providers outside of the [address] [place] [address] since your last visit? Include any pap smears or colon screening.  No

## 2019-07-03 NOTE — PATIENT INSTRUCTIONS
Carpal Tunnel Syndrome: Care Instructions  Your Care Instructions    Carpal tunnel syndrome is a nerve problem. It can cause tingling, numbness, weakness, or pain in the fingers, thumb, and hand. The median nerve and several tough tissues called tendons run through a space in the wrist called the carpal tunnel. The repeated hand motions used in work and some hobbies and sports can put pressure on the nerve. Pregnancy and several conditions, including diabetes, arthritis, and an underactive thyroid, also can cause carpal tunnel syndrome. You may be able to limit an activity or do it differently to reduce your symptoms. You also can take other steps to feel better. If your symptoms are mild, 1 to 2 weeks of home treatment are likely to ease your pain. Surgery is needed only if other treatments do not work. Follow-up care is a key part of your treatment and safety. Be sure to make and go to all appointments, and call your doctor if you are having problems. It's also a good idea to know your test results and keep a list of the medicines you take. How can you care for yourself at home? · If possible, stop or reduce the activity that causes your symptoms. If you cannot stop the activity, take frequent breaks to rest and stretch or change hand positions to do a task. Try switching hands, such as when using a computer mouse. · Try to avoid bending or twisting your wrists. · Ask your doctor if you can take an over-the-counter pain medicine, such as acetaminophen (Tylenol), ibuprofen (Advil, Motrin), or naproxen (Aleve). Be safe with medicines. Read and follow all instructions on the label. · If your doctor prescribes corticosteroid medicine to help reduce pain and swelling, take it exactly as prescribed. Call your doctor if you think you are having a problem with your medicine. · Put ice or a cold pack on your wrist for 10 to 20 minutes at a time to ease pain.  Put a thin cloth between the ice and your skin.  · If your doctor or your physical or occupational therapist tells you to wear a wrist splint, wear it as directed to keep your wrist in a neutral position. This also eases pressure on your median nerve. · Ask your doctor whether you should have physical or occupational therapy to learn how to do tasks differently. · Try a yoga class to stretch your muscles and build strength in your hands and wrists. Yoga has been shown to ease carpal tunnel symptoms. To prevent carpal tunnel  · When working at a computer, keep your hands and wrists in line with your forearms. Hold your elbows close to your sides. Take a break every 10 to 15 minutes. · Try these exercises:  ? Warm up: Rotate your wrist up, down, and from side to side. Repeat this 4 times. Stretch your fingers far apart, relax them, then stretch them again. Repeat 4 times. Stretch your thumb by pulling it back gently, holding it, and then releasing it. Repeat 4 times. ? Prayer stretch: Start with your palms together in front of your chest just below your chin. Slowly lower your hands toward your waistline while keeping your hands close to your stomach and your palms together until you feel a mild to moderate stretch under your forearms. Hold for 10 to 20 seconds. Repeat 4 times. ? Wrist flexor stretch: Hold your arm in front of you with your palm up. Bend your wrist, pointing your hand toward the floor. With your other hand, gently bend your wrist further until you feel a mild to moderate stretch in your forearm. Hold for 10 to 20 seconds. Repeat 4 times. ? Wrist extensor stretch: Repeat the steps for the wrist flexor stretch, but begin with your extended hand palm down. · Squeeze a rubber exercise ball several times a day to keep your hands and fingers strong. · Avoid holding objects (such as a book) in one position for a long time. When possible, use your whole hand to grasp an object.  Using just the thumb and index finger can put stress on the wrist.  · Do not smoke. It can make this condition worse by reducing blood flow to the median nerve. If you need help quitting, talk to your doctor about stop-smoking programs and medicines. These can increase your chances of quitting for good. When should you call for help? Watch closely for changes in your health, and be sure to contact your doctor if:    · Your pain or other problems do not get better with home care.     · You want more information about physical or occupational therapy.     · You have side effects of your corticosteroid medicine, such as:  ? Weight gain. ? Mood changes. ? Trouble sleeping. ? Bruising easily.     · You have any other problems with your medicine. Where can you learn more? Go to http://isabel-shabbir.info/. Enter R432 in the search box to learn more about \"Carpal Tunnel Syndrome: Care Instructions. \"  Current as of: September 20, 2018  Content Version: 11.9  © 2703-8250 [employer]. Care instructions adapted under license by [employer] (which disclaims liability or warranty for this information). If you have questions about a medical condition or this instruction, always ask your healthcare professional. [place] [address] any warranty or liability for your use of this information.

## 2019-07-03 NOTE — PROGRESS NOTES
Ambika Almaguer is a 34 y.o. female and presents with Tingling (and burning in bilateral hands x 3 months)  . Subjective:  Pt comes-in w ~ 3 mth c/o rt hand and forearm numbness. Pt relays she feels her sxs WORSE at night, but has been occurring more frequently during the dayy. Pt has never been dx'ed w  CTS. Pt has been using ibuprofen w/o relief. Pt is using wrist splints nocturnally w/o relief  Pt does not perform repetitive mvmnt at her job. Review of Systems  Review of systems (12) negative, except noted above. Past Medical History:   Diagnosis Date    Abnormal Pap smear     Adjustment disorder with mixed anxiety and depressed mood 10/24/2018    Anemia     Chronic kidney disease     Chronic pain     abdominal,lower right flank    Hydronephrosis, right 8/22/2014     Past Surgical History:   Procedure Laterality Date    HX GYN      vaginal birth x 2    HX OTHER SURGICAL  2016 August     urethral stent    HX OTHER SURGICAL  03/2017    urethral stents replaced multiple times, last placed right urethal stent in march 2017    HX UROLOGICAL      stent placements     Social History     Socioeconomic History    Marital status: SINGLE     Spouse name: Not on file    Number of children: Not on file    Years of education: Not on file    Highest education level: Not on file   Tobacco Use    Smoking status: Current Some Day Smoker     Years: 2.00    Smokeless tobacco: Never Used    Tobacco comment: socially   Substance and Sexual Activity    Alcohol use:  Yes     Alcohol/week: 0.6 oz     Types: 1 Standard drinks or equivalent per week     Frequency: Never     Comment: socially    Drug use: No     Comment: last used 1 week ago    Sexual activity: Yes     Partners: Male     Birth control/protection: None     Family History   Problem Relation Age of Onset    Elevated Lipids Mother     Hypertension Mother     Cancer Paternal Aunt     Cancer Paternal Uncle     Arthritis-osteo Maternal Grandmother     Diabetes Maternal Grandmother     Cancer Paternal Grandmother     Diabetes Paternal Grandmother      Current Outpatient Medications   Medication Sig Dispense Refill    ondansetron (ZOFRAN ODT) 4 mg disintegrating tablet Take 1 Tab by mouth every eight (8) hours as needed for Nausea. 10 Tab 0    mupirocin (BACTROBAN) 2 % ointment Apply  to affected area daily. 22 g 0     No Known Allergies    Objective:  Visit Vitals  /67 (BP 1 Location: Left arm, BP Patient Position: Sitting)   Pulse 69   Temp 98.7 °F (37.1 °C) (Oral)   Resp 19   Ht 5' 6\" (1.676 m)   Wt 168 lb (76.2 kg)   LMP 06/26/2019 (Exact Date)   SpO2 96%   BMI 27.12 kg/m²     Physical Exam:   General appearance - alert, well appearing, and in no distress. Pleasant  Mental status - alert, oriented to person, place, and time  EYE-EOMI  Neck - supple, no significant adenopathy   Chest - clear to auscultation, no wheezes, rales or rhonchi, symmetric air entry   Heart - normal rate, regular rhythm, normal S1, S2, no murmurs, rubs, clicks or gallops   Wrist + tinels  Ext-peripheral pulses normal, no pedal edema, no clubbing or cyanosis  Skin-Warm and dry.  no hyperpigmentation, vitiligo, or suspicious lesions  Neuro -alert, oriented, normal speech, no focal findings or movement disorder noted      Results for orders placed or performed during the hospital encounter of 12/30/18   STREP AG SCREEN, GROUP A   Result Value Ref Range    Group A Strep Ag ID NEGATIVE  NEG     INFLUENZA A & B AG (RAPID TEST)   Result Value Ref Range    Influenza A Antigen NEGATIVE  NEG      Influenza B Antigen NEGATIVE  NEG     CULTURE, THROAT   Result Value Ref Range    Special Requests: NO SPECIAL REQUESTS      Culture result: NORMAL RESPIRATORY WALLY/NO BETA STREP ISOLATED         Assessment/Plan:    ICD-10-CM ICD-9-CM    1. Bilateral carpal tunnel syndrome G56.03 354.0 EMG TWO EXTREMITIES UPPER     Orders Placed This Encounter    EMG TWO EXTREMITIES UPPER Standing Status:   Future     Standing Expiration Date:   1/3/2020     Order Specific Question:   Reason for Exam:     Answer:   michael hand numbness     1. Bilateral carpal tunnel syndrome  Pt declines referral to ortho  - EMG TWO EXTREMITIES UPPER; Future      There are no Patient Instructions on file for this visit. I have reviewed with the patient details of the assessment and plan and all questions were answered. Relevent patient education was performed. The most recent lab findings were reviewed with the patient. An After Visit Summary was printed and given to the patient.

## 2019-08-06 ENCOUNTER — TELEPHONE (OUTPATIENT)
Dept: INTERNAL MEDICINE CLINIC | Age: 29
End: 2019-08-06

## 2019-08-06 PROBLEM — G56.03 BILATERAL CARPAL TUNNEL SYNDROME: Status: ACTIVE | Noted: 2019-08-06

## 2019-08-06 PROBLEM — Z3A.01 LESS THAN 8 WEEKS GESTATION OF PREGNANCY: Status: RESOLVED | Noted: 2018-10-24 | Resolved: 2019-08-06

## 2019-08-06 NOTE — TELEPHONE ENCOUNTER
Pt called and  verified. Her EMG showed severe CTS and she is s/p cortisone injection.  If no improvement, will proceed w release procedure

## 2019-09-16 ENCOUNTER — OFFICE VISIT (OUTPATIENT)
Dept: INTERNAL MEDICINE CLINIC | Age: 29
End: 2019-09-16

## 2019-09-16 ENCOUNTER — HOSPITAL ENCOUNTER (EMERGENCY)
Age: 29
Discharge: STILL ADMITTED OR ADMINISTRATIVE | End: 2019-09-16
Attending: EMERGENCY MEDICINE
Payer: COMMERCIAL

## 2019-09-16 VITALS
DIASTOLIC BLOOD PRESSURE: 70 MMHG | BODY MASS INDEX: 26.52 KG/M2 | OXYGEN SATURATION: 98 % | RESPIRATION RATE: 18 BRPM | SYSTOLIC BLOOD PRESSURE: 142 MMHG | HEART RATE: 113 BPM | HEIGHT: 66 IN | WEIGHT: 165 LBS | TEMPERATURE: 99.2 F

## 2019-09-16 VITALS
TEMPERATURE: 98.6 F | BODY MASS INDEX: 26.2 KG/M2 | SYSTOLIC BLOOD PRESSURE: 132 MMHG | HEIGHT: 66 IN | RESPIRATION RATE: 19 BRPM | DIASTOLIC BLOOD PRESSURE: 74 MMHG | OXYGEN SATURATION: 95 % | WEIGHT: 163 LBS | HEART RATE: 96 BPM

## 2019-09-16 DIAGNOSIS — K92.0 HEMATEMESIS WITH NAUSEA: Primary | ICD-10-CM

## 2019-09-16 DIAGNOSIS — F10.10 ALCOHOL ABUSE: ICD-10-CM

## 2019-09-16 DIAGNOSIS — Z3A.01 LESS THAN 8 WEEKS GESTATION OF PREGNANCY: ICD-10-CM

## 2019-09-16 PROBLEM — F10.11 HISTORY OF ALCOHOL ABUSE: Status: RESOLVED | Noted: 2018-10-24 | Resolved: 2019-09-16

## 2019-09-16 LAB
HCG URINE, QL. (POC): POSITIVE
HGB BLD-MCNC: 11.3 G/DL
VALID INTERNAL CONTROL?: YES

## 2019-09-16 PROCEDURE — 75810000275 HC EMERGENCY DEPT VISIT NO LEVEL OF CARE

## 2019-09-16 PROCEDURE — 99281 EMR DPT VST MAYX REQ PHY/QHP: CPT

## 2019-09-16 NOTE — PROGRESS NOTES
Maddi Clement is a 34 y.o. female and presents with Vomiting Blood (x2 days 3 times)  . Subjective:  Pt comes-in for vomited blood x2  x 3 days. She is NOT vomiting marianela blood, but has blood streaks in her vomitus. Last episode ~ 24 hrs ago. No brbpr. No tarry stools. Pt is an active alcoholic. Pt believes she is pregnant. Pt was using nuva ring for birth control but stopped bc it would often get dislodged during sexual intercourse. Pt has had an IUD in the past, but had it d/c'ed due to discomfort. PMH- heavy alcohol use. Was previously attending AA mtgs   H/o renal surgery due to reflux    LMP- 8/18/19  urine hcg +  POC hgb >11    Review of Systems  Review of systems (12) negative, except noted above. Past Medical History:   Diagnosis Date    Abnormal Pap smear     Adjustment disorder with mixed anxiety and depressed mood 10/24/2018    Anemia     Chronic kidney disease     Chronic pain     abdominal,lower right flank    Hydronephrosis, right 8/22/2014     Past Surgical History:   Procedure Laterality Date    HX GYN      vaginal birth x 2    HX OTHER SURGICAL  2016 August     urethral stent    HX OTHER SURGICAL  03/2017    urethral stents replaced multiple times, last placed right urethal stent in march 2017    HX UROLOGICAL      stent placements     Social History     Socioeconomic History    Marital status: SINGLE     Spouse name: Not on file    Number of children: Not on file    Years of education: Not on file    Highest education level: Not on file   Tobacco Use    Smoking status: Current Some Day Smoker     Years: 2.00    Smokeless tobacco: Never Used    Tobacco comment: socially   Substance and Sexual Activity    Alcohol use:  Yes     Alcohol/week: 1.0 standard drinks     Types: 1 Standard drinks or equivalent per week     Frequency: Never     Comment: socially    Drug use: No     Comment: last used 1 week ago    Sexual activity: Yes     Partners: Male     Birth control/protection: None     Family History   Problem Relation Age of Onset    Elevated Lipids Mother     Hypertension Mother     Cancer Paternal Aunt     Cancer Paternal Uncle     Arthritis-osteo Maternal Grandmother     Diabetes Maternal Grandmother     Cancer Paternal Grandmother     Diabetes Paternal Grandmother        No Known Allergies    Objective:  Visit Vitals  /74 (BP 1 Location: Left arm, BP Patient Position: Sitting)   Pulse 96   Temp 98.6 °F (37 °C) (Oral)   Resp 19   Ht 5' 6\" (1.676 m)   Wt 163 lb (73.9 kg)   LMP 08/28/2019 (LMP Unknown)   SpO2 95%   BMI 26.31 kg/m²     Physical Exam:   General appearance - alert, well appearing, and in no distress  Mental status - alert, oriented to person, place, and time  EYE-EOMI  Neck - supple, no significant adenopathy   Chest - clear to auscultation, no wheezes, rales or rhonchi, symmetric air entry   Heart - normal rate, regular rhythm, normal S1, S2, no murmurs, rubs, clicks or gallops   Back + upper back tenderness over rt trapezius distribution  Ext-peripheral pulses normal, no pedal edema, no clubbing or cyanosis  Skin-Warm and dry. no hyperpigmentation, vitiligo, or suspicious lesions  Neuro -alert, oriented, normal speech, no focal findings or movement disorder noted      Results for orders placed or performed in visit on 09/16/19   AMB POC HEMOGLOBIN (HGB)   Result Value Ref Range    Hemoglobin (POC) 11.3    AMB POC URINE PREGNANCY TEST, VISUAL COLOR COMPARISON   Result Value Ref Range    VALID INTERNAL CONTROL POC Yes     HCG urine, Ql. (POC) Positive Negative       Assessment/Plan:    ICD-10-CM ICD-9-CM    1. Hematemesis with nausea K92.0 578.0 AMB POC HEMOGLOBIN (HGB)     787.02 AMB POC URINE PREGNANCY TEST, VISUAL COLOR COMPARISON   2. Less than 8 weeks gestation of pregnancy Z3A.01 V22.2    3.  Alcohol abuse F10.10 305.00      Orders Placed This Encounter    AMB POC HEMOGLOBIN (HGB)    AMB POC URINE PREGNANCY TEST, VISUAL COLOR COMPARISON   1. Hematemesis with nausea  Pt is hemodynamically stable w/o recurrence >1 day  ? Don Commander given h/o alcoholism  Pt strongly encouraged to go to ED if recurs  - AMB POC HEMOGLOBIN (HGB)  - AMB POC URINE PREGNANCY TEST, VISUAL COLOR COMPARISON    2. Less than 8 weeks gestation of pregnancy  Pt relays she will seek TOP    3. Alcohol abuse  Pt STRONGLY encouraged to RE enter inpt rehab and attend AA        There are no Patient Instructions on file for this visit. I have reviewed with the patient details of the assessment and plan and all questions were answered. Relevent patient education was performed. The most recent lab findings were reviewed with the patient. An After Visit Summary was printed and given to the patient.

## 2019-09-16 NOTE — PROGRESS NOTES
Chief Complaint   Patient presents with    Vomiting Blood     x2 days 3 times     1. Have you been to the ER, urgent care clinic since your last visit? Hospitalized since your last visit? No    2. Have you seen or consulted any other health care providers outside of the [address] [place] [address] since your last visit? Include any pap smears or colon screening.  No

## 2019-10-01 DIAGNOSIS — G56.03 BILATERAL CARPAL TUNNEL SYNDROME: ICD-10-CM

## 2019-11-27 ENCOUNTER — APPOINTMENT (OUTPATIENT)
Dept: ULTRASOUND IMAGING | Age: 29
End: 2019-11-27
Attending: EMERGENCY MEDICINE
Payer: COMMERCIAL

## 2019-11-27 ENCOUNTER — HOSPITAL ENCOUNTER (EMERGENCY)
Age: 29
Discharge: LEFT BEFORE DISCHARGE | End: 2019-11-27
Attending: EMERGENCY MEDICINE
Payer: COMMERCIAL

## 2019-11-27 VITALS
SYSTOLIC BLOOD PRESSURE: 136 MMHG | RESPIRATION RATE: 20 BRPM | OXYGEN SATURATION: 98 % | TEMPERATURE: 97.8 F | HEART RATE: 81 BPM | WEIGHT: 152.7 LBS | BODY MASS INDEX: 24.54 KG/M2 | DIASTOLIC BLOOD PRESSURE: 86 MMHG | HEIGHT: 66 IN

## 2019-11-27 DIAGNOSIS — R10.2 PELVIC PAIN: ICD-10-CM

## 2019-11-27 DIAGNOSIS — Z53.29 LEFT AGAINST MEDICAL ADVICE: Primary | ICD-10-CM

## 2019-11-27 DIAGNOSIS — O20.0 THREATENED MISCARRIAGE: ICD-10-CM
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
## 2019-11-27 PROCEDURE — 75810000275 HC EMERGENCY DEPT VISIT NO LEVEL OF CARE

## 2019-11-27 RX ORDER — ACETAMINOPHEN 325 MG/1
975 TABLET ORAL
Status: DISCONTINUED | OUTPATIENT
Start: 2019-11-27 | End: 2019-11-27

## 2019-11-27 NOTE — DISCHARGE INSTRUCTIONS
Thank you for allowing us to take care of you today! We hope we addressed all of your concerns and needs. We strive to provide excellent quality care in the Emergency Department. You will receive a survey after your visit to evaluate the care you were provided. Should you receive a survey from us, we invite you to share your experience and tell us what made it excellent. It was a pleasure serving you, we invite you to share your experience with us, in our pursuit for excellence, should you be selected to receive a survey. The exam and treatment you received in the Emergency Department were for an urgent problem and are not intended as complete care. It is important that you follow up with a doctor, nurse practitioner, or physician assistant for ongoing care. If your symptoms become worse or you do not improve as expected and you are unable to reach your usual health care provider, you should return to the Emergency Department. We are available 24 hours a day. Please take your discharge instructions with you when you go to your follow-up appointment. If you have any problem arranging a follow-up appointment, contact the Emergency Department immediately. If a prescription has been provided, please have it filled as soon as possible to prevent a delay in treatment. Read the entire medication instruction sheet provided to you by the pharmacy. If you have any questions or reservations about taking the medication due to side effects or interactions with other medications, please call your primary care physician or contact the ER to speak with the charge nurse. Make an appointment with your family doctor or the physician you were referred to for follow-up of this visit as instructed on your discharge paperwork, as this is mandatory follow-up. Return to the ER if you are unable to be seen or if you are unable to be seen in a timely manner.     If you have any problem arranging the follow-up visit, contact the Emergency Department immediately. I hope you feel better and thank you again for allow us to provide you with excellent care today at [facility]! Warmest regards,    Leonor Riggs MD  Emergency Medicine Physician  [facility]        _____________________________________________________________________________________________________________    Vitals:    11/27/19 0403 11/27/19 0407   BP: 136/86    BP 1 Location: Right arm    BP Patient Position: At rest    Pulse: 81    Resp: 20    Temp: 97.8 °F (36.6 °C)    SpO2: 98% 98%   Weight: 69.3 kg (152 lb 11.2 oz)    Height: 5' 6\" (1.676 m)        No results found for this or any previous visit (from the past 12 hour(s)).     [facility]    (Results Pending)     CT Results  (Last 48 hours)    None          Local Primary Care Physicians   [facility] [phone]  MD Ruth Stevens MD Ocie Fess, MD [facility] [phone]  Filemon Epstein, [facility]  Karina Mcdaniel, MD Farheen Barahona MD [address]  [phone]  [place] [address] [facility] [phone]  Gurney Holter, MD Jenine Oppenheim, MD Adilene Walsh, MD Kusum Kim MD   [facility] [phone]  CONCHA MERRITT, MD Bharati Escobar, NP [address] [phone]  Sol Garcia, MD Yasmeen Schneider, MD Addie Sanchez, MD Aisha Wasserman, MD Nola Mccurdy MD   [facility]  Vladimir Russell MD [address] [facility] [address] [phone]  MD Pedro Velazco, EMILIA Sarabia, MD Lauren Bliss MD Loy Pare, MD Aundria Marshall, MD   [address] [facility] [phone]  Cody Alvarenga, MD Betsey Montelongo, [facility]  Aleida Rebolledo, EMILIA Dinero, MD Demario Tamez MD Betty Craw, MD [facility] [phone]  MD Eladia Spears, MD Teri Aquino MD Georjean Siskin, MD   [facility] [phone]  MD Ophelia Concepcion MD [place] [phone]  MD Rich Elias, MD Jayne Hernandez MD   [address] [facility] [phone]  MD Elizabeth Montilla MD Twyla England, MD Jannette Juárez MD Kedric Po, NP  Shravan Morin MD [address] [place] [address]   [phone]  MD Svitlana Dolan MD Conley John, MD     [address] [facility] [phone]  MD Laura Bazzi, P  Adriana Garza, PANUZHAT Garza, FNP  Geovanna Myles, MD Beckie Melgar, EMILIA Phillips DO   Miscellaneous:  Yana Mehta MD [facility] Departments   For adult and child immunizations, family planning, TB screening, STD testing and women's health services. [facility]: [place] [phone]     [address]: [address] [phone]     [address] [place] [address]        [address] [address]  For primary care services, woman and child wellness, and some clinics providing specialty care. [facility] -- [address] [facility] [facility] [phone]   [address] [facility] CHILDREN'[facility] 200 [facility] 3614 [facility] [phone]   [address] [place] [address] [address] [phone] [address]  [employer] [address] [place] [address] [address] [place] [address] Dr [phone] [address] [facility]  [address] [address] [place] [phone]   [address] [phone]   Boris Caldwell [address] [phone]   [facility]: [facility] 4[address] [facility] 820 [facility] [address]     [place] [address] 8 Outreach [address] [facility] [phone]   [employer]  [address] ([email or web address])         Sexual Health/Woman Wellness Clinics   For STD/HIV testing and treatment, pregnancy testing and services, men's health, birth control services, LGBT services, and hepatitis/HPV vaccine services. [employer] [facility] [place] [employer] [address] [facility] [address] [place] [address] [facility] [phone]   [facility] [address] , 5th floor [phone]   Pregnancy [address] [facility] [address]  Dee Dee Quintana [phone]        [place] [address] [facility]   [phone]   [place]   [phone]   Women, Infant and Children's Services: [address] [phone]       [address] [facility] 121 Intervention   [phone]   [address] [facility] 16.   [address] [facility] [place]

## 2019-11-27 NOTE — ED TRIAGE NOTES
Pt reports buttocks pain, bilateral upper leg pain, pelvic pressure, and vag discharge w/ spotting after a fall on yesterday at work. Pt reports that she is pregnant. Pt is unsure of the number of weeks. Pt admits to not having any prenatal care. Pt reports her LMP was approx the end of July.

## 2019-11-27 NOTE — ED PROVIDER NOTES
EMERGENCY DEPARTMENT HISTORY AND PHYSICAL EXAM      Please note that this dictation was completed with [employer], the computer voice recognition software. Quite often unanticipated grammatical, syntax, homophones, and other interpretive errors are inadvertently transcribed by the computer software. Please disregard these errors and any errors that have escaped final proofreading. Thank you. Date: 2019  Patient Name: Lloyd Fletcher  Patient Age and Sex: 34 y.o. female    History of Presenting Illness     Chief Complaint   Patient presents with    Fall       History Provided By: Patient    HPI: Lloyd Fletcher, 34 y.o. female with past medical history as documented below presents to the ED with c/o of lower abd pain, pelvic pressure after fall at work about 24 hours ago. Pt denies LOC or head injury. She reports vaginal spotting that has been ongoing for a week prior to this. She reports her LMP  and states this is not a desired pregnancy and states she plan for a late term . She has had no prenatal care. She did take Tylenol PTA for pain. Pt denies any other alleviating or exacerbating factors. Additionally, pt specifically denies any recent fever, chills, headache, nausea, vomiting, CP, SOB, lightheadedness, dizziness, numbness, weakness, BLE swelling, heart palpitations, urinary sxs, diarrhea, constipation, melena, hematochezia, cough, or congestion. There are no other complaints, changes or physical findings at this time.      PCP: Julianne Verma MD    Past History   Past Medical History:  Past Medical History:   Diagnosis Date    Abnormal Pap smear     Adjustment disorder with mixed anxiety and depressed mood 10/24/2018    Anemia     Chronic kidney disease     Chronic pain     abdominal,lower right flank    Hydronephrosis, right 2014       Past Surgical History:  Past Surgical History:   Procedure Laterality Date    HX GYN      vaginal birth x 2    HX OTHER SURGICAL   August     urethral stent    HX OTHER SURGICAL  03/2017    urethral stents replaced multiple times, last placed right urethal stent in march 2017    HX UROLOGICAL      stent placements       Family History:  Family History   Problem Relation Age of Onset    Elevated Lipids Mother     Hypertension Mother     Cancer Paternal Aunt     Cancer Paternal Uncle     Arthritis-osteo Maternal Grandmother     Diabetes Maternal Grandmother     Cancer Paternal Grandmother     Diabetes Paternal Grandmother        Social History:  Social History     Tobacco Use    Smoking status: Current Some Day Smoker     Years: 2.00    Smokeless tobacco: Never Used    Tobacco comment: socially   Substance Use Topics    Alcohol use: Yes     Alcohol/week: 1.0 standard drinks     Types: 1 Standard drinks or equivalent per week     Frequency: Never     Comment: socially    Drug use: No     Comment: last used 1 week ago       Allergies:  No Known Allergies    Current Medications:  No current facility-administered medications on file prior to encounter. No current outpatient medications on file prior to encounter. Review of Systems   Review of Systems   Constitutional: Negative. Negative for chills and fever. HENT: Negative. Negative for congestion, facial swelling, rhinorrhea, sore throat, trouble swallowing and voice change. Eyes: Negative. Respiratory: Negative. Negative for apnea, cough, chest tightness, shortness of breath and wheezing. Cardiovascular: Negative. Negative for chest pain, palpitations and leg swelling. Gastrointestinal: Positive for abdominal pain. Negative for abdominal distention, blood in stool, constipation, diarrhea, nausea and vomiting. Endocrine: Negative. Negative for cold intolerance, heat intolerance and polyuria. Genitourinary: Positive for pelvic pain and vaginal bleeding. Negative for difficulty urinating, dysuria, flank pain, frequency, hematuria and urgency. Musculoskeletal: Negative. Negative for arthralgias, back pain, myalgias, neck pain and neck stiffness. Skin: Negative. Negative for color change and rash. Neurological: Negative. Negative for dizziness, syncope, facial asymmetry, speech difficulty, weakness, light-headedness, numbness and headaches. Hematological: Negative. Does not bruise/bleed easily. Psychiatric/Behavioral: Negative. Negative for confusion and self-injury. The patient is not nervous/anxious. Physical Exam   Physical Exam  Vitals signs and nursing note reviewed. Constitutional:       General: She is not in acute distress. Appearance: She is well-developed. She is not diaphoretic. HENT:      Head: Normocephalic and atraumatic. Mouth/Throat:      Pharynx: No oropharyngeal exudate. Eyes:      Conjunctiva/sclera: Conjunctivae normal.      Pupils: Pupils are equal, round, and reactive to light. Neck:      Musculoskeletal: Normal range of motion. Cardiovascular:      Rate and Rhythm: Normal rate and regular rhythm. Heart sounds: Normal heart sounds. No murmur. No friction rub. No gallop. Pulmonary:      Effort: Pulmonary effort is normal. No respiratory distress. Breath sounds: Normal breath sounds. No wheezing or rales. Chest:      Chest wall: No tenderness. Abdominal:      General: Bowel sounds are normal. There is no distension. Palpations: Abdomen is soft. There is no mass. Tenderness: There is no tenderness. There is no guarding or rebound. Musculoskeletal: Normal range of motion. General: No tenderness or deformity. Skin:     General: Skin is warm. Findings: No rash. Neurological:      Mental Status: She is alert and oriented to person, place, and time. Cranial Nerves: No cranial nerve deficit. Motor: No abnormal muscle tone.       Coordination: Coordination normal.      Deep Tendon Reflexes: Reflexes normal.         Diagnostic Study Results     Labs -  No results found for this or any previous visit (from the past 24 hour(s)). Radiologic Studies -   No orders to display     CT Results  (Last 48 hours)    None        CXR Results  (Last 48 hours)    None          Medical Decision Making   I am the first provider for this patient. I reviewed the vital signs, available nursing notes, past medical history, past surgical history, family history and social history. Vital Signs-Reviewed the patient's vital signs. Patient Vitals for the past 24 hrs:   Temp Pulse Resp BP SpO2   19 0407     98 %   19 0403 97.8 °F (36.6 °C) 81 20 136/86 98 %     Pulse Oximetry Analysis - 98% on RA    Cardiac Monitor:   Rate: 81 bpm  Rhythm: Normal Sinus Rhythm      Records Reviewed: Nursing Notes, Old Medical Records, Previous electrocardiograms, Previous Radiology Studies and Previous Laboratory Studies    Provider Notes (Medical Decision Making):   Pt presents with vaginal bleeding, pelvic pain during pregnancy. DDx; ectopic, molar pregnancy, physiologic bleeding, threatened . Will get beta hcg, T+S for Rh status, and Transvaginal US to further evaluate. Pt has been re-examined and states that they are feeling better and have no new complaints. Laboratory tests, medications, x-rays, diagnosis, follow up plan and return instructions have been reviewed and discussed with the patient and/or family. Pt and/or family have had the opportunity to ask questions about their care. Patient and/or family express understanding and agreement with care plan, follow up and return instructions. Patent and/or family agree to call an OB/GYN as soon as possible for a repeat HCG test in 48 hours. Patient and family understand that they could still have a miscarriage even with POC seen in the uterus and that a heterotopic pregnancy is still a very small possibility.   The patient and family understand that OB/GYN follow up is necessary to evaluate these conditions. ED Course:   Initial assessment performed. The patients presenting problems have been discussed, and they are in agreement with the care plan formulated and outlined with them. I have encouraged them to ask questions as they arise throughout their visit. TOBACCO COUNSELING:   Upon evaluation, pt expressed that they are a current tobacco user. For approximately 10 minutes, pt has been counseled on the dangers of smoking and was encouraged to quit as soon as possible in order to decrease further risks to their health. Pt has conveyed their understanding of the risks involved should they continue to use tobacco products. ALCOHOL/SUBSTANCE ABUSE COUNSELING:  Upon evaluation, pt endorsed recent alcohol/illicit drug use. For approximately 15 minutes, pt has been counseled on the dangers of alcohol and illicit drug use on their health, and they were encouraged to quit as soon as possible in order to decrease further risks to their health. Pt has conveyed their understanding of the risks involved should they continue to use these products. I reviewed our electronic medical record system for any past medical records that were available that may contribute to the patient's current condition, the nursing notes and vital signs from today's visit. Soco Antonio MD    Progress Note:  After pt left bathroom to give urine sample, she walked out and stated she had to leave. Vitals stable, benign abd exam, will kinder discharge. I informed the pt that she needed further observation, further workup for adequate evaluation for her abdominal pain, and that by refusing the above, she is at risk for myocardial infarction, arrhythmia, sudden death, paralysis, further deterioration, coma. She is awake, alert, and she understands her condition and the risks involved in leaving. She is clinically aware of her surroundings and able to ask appropriate questions.  The nurse present confirms she is not clinically intoxicated and able to make medical decisions. She verbalized knowing the risks and understood it was recommended that she stay and could also return at any time. She was provided with warnings regarding worsening of her condition and were provided instructions to follow up with Master Schaefer MD tomorrow or return to the Emergency Room as soon as possible. This discussion was witnessed by nurse Orlin Cabrera RN. Progress Note:  Patient has been reassessed and reports feeling better and symptoms have improved significantly after ED treatment. Patient feels comfortable going home with close follow-up. Radu Cid final labs and imaging have been reviewed with her and available family and/or caregiver. They have been counseled regarding her diagnosis. She verbally conveys understanding and agreement of the signs, symptoms, diagnosis, treatment and prognosis and additionally agrees to follow up as recommended with Dr. Master Schaefer MD and/or specialist in 24 - 48 hours. She also agrees with the care-plan we created together and conveys that all of her questions have been answered. I have also put together some discharge instructions for her that include: 1) educational information regarding their diagnosis, 2) how to care for their diagnosis at home, as well a 3) list of reasons why they would want to return to the ED prior to their follow-up appointment should the patient's condition change or symptoms worsen. I have answered all questions to the patient's satisfaction. Strict return precautions given. She both understood and agreed with plan as discussed. Vital signs stable for discharge. Disposition: DISCHARGE  The pt is ready for discharge. The pt's signs, symptoms, diagnosis, and discharge instructions have been discussed and pt has conveyed their understanding. The pt is to follow up as recommended or return to ER should their symptoms worsen.  Plan has been discussed and pt is in agreement. PLAN:  1. Return precautions as discussed. 2. No current facility-administered medications for this encounter. No current outpatient medications on file. 3.   Follow-up Information     Follow up With Specialties Details Why Contact Info    Maldonado Torrez MD Internal Medicine   [address]  [place] [address] [id number]  [phone]      [facility] EMERGENCY DEPT Emergency Medicine  As needed, If symptoms worsen [address] [facility]  [phone]          Return to ED if worse  Diagnosis     Clinical Impression:   1. Left against medical advice    2. Threatened miscarriage    3. Pelvic pain    4. Fall from ground level        Attestation:  I personally performed the services described in this documentation on this date 11/27/2019 for patient, Maria Del Rosario Jackson. Hawa Pugh MD      This note will not be viewable in [address]
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
## 2019-11-27 NOTE — ED NOTES
Pt arrived to ED via ambulatory with c/o lower abdominal pain, upper anterior/posterior thigh and left flank r/t a fall, while pregnant, yesterday at work. Pt. Now reports blood tinged discharge and spotting. Pt. Reports she fell onto her left side between a table. Pt. Reports LMP about 7-. Pt. Reports nausea and vomiting. Pt. States she is unsure of JOHN PAUL as she has had no prenatal care. Lungs clear. Pt is in no acute distress. Will continue to monitor. See nursing assessment. Safety precautions in place; call light within reach. Emergency Department Nursing Plan of Care       The Nursing Plan of Care is developed from the Nursing assessment and Emergency Department Attending provider initial evaluation. The plan of care may be reviewed in the ED Provider note.     The Plan of Care was developed with the following considerations:   Patient / Family readiness to learn indicated by:verbalized understanding  Persons(s) to be included in education: patient  Barriers to Learning/Limitations:No    Signed     Jhonatan Ocasio RN    11/27/2019   4:14 AM

## 2019-12-19 ENCOUNTER — HOSPITAL ENCOUNTER (EMERGENCY)
Age: 29
Discharge: HOME | End: 2019-12-19
Attending: EMERGENCY MEDICINE
Payer: COMMERCIAL

## 2019-12-19 VITALS
OXYGEN SATURATION: 100 % | HEIGHT: 66 IN | SYSTOLIC BLOOD PRESSURE: 141 MMHG | HEART RATE: 79 BPM | BODY MASS INDEX: 24.59 KG/M2 | WEIGHT: 153 LBS | RESPIRATION RATE: 16 BRPM | DIASTOLIC BLOOD PRESSURE: 70 MMHG | TEMPERATURE: 98 F

## 2019-12-19 DIAGNOSIS — F19.10 POLYSUBSTANCE ABUSE (HCC): Primary | ICD-10-CM

## 2019-12-19 DIAGNOSIS — Z3A.24 24 WEEKS GESTATION OF PREGNANCY: ICD-10-CM

## 2019-12-19 PROCEDURE — 99283 EMERGENCY DEPT VISIT LOW MDM: CPT

## 2019-12-19 PROCEDURE — 93005 ELECTROCARDIOGRAM TRACING: CPT

## 2019-12-19 RX ORDER — ONDANSETRON 4 MG/1
4 TABLET, ORALLY DISINTEGRATING ORAL
Qty: 10 TAB | Refills: 0 | Status: SHIPPED | OUTPATIENT
Start: 2019-12-19

## 2019-12-20 LAB
ATRIAL RATE: 63 BPM
CALCULATED R AXIS, ECG10: 74 DEGREES
CALCULATED T AXIS, ECG11: 53 DEGREES
DIAGNOSIS, 93000: NORMAL
P-R INTERVAL, ECG05: 136 MS
Q-T INTERVAL, ECG07: 414 MS
QRS DURATION, ECG06: 78 MS
QTC CALCULATION (BEZET), ECG08: 423 MS
VENTRICULAR RATE, ECG03: 63 BPM

## 2019-12-20 NOTE — ED TRIAGE NOTES
Pt is here with [employer] for medical clearance. Pt is pregnant and reports she is withdrawing from drugs. Pt reports using heroin, cocaine, marijuana. Pt reports she is approx 5 months pregnant, has not seen OBGYN.  Pt reports nausea, body aches, reports she sniff heroin every few hours

## 2019-12-20 NOTE — ED NOTES
Patient (s)  given copy of dc instructions and 2 script(s). Patient (s)  verbalized understanding of instructions and script (s). Patient given a current medication reconciliation form and verbalized understanding of their medications. Patient (s) verbalized understanding of the importance of discussing medications with  his or her physician or clinic they will be following up with. Patient alert and oriented and in no acute distress. Patient discharged home ambulatory with [employer].

## 2019-12-20 NOTE — ED PROVIDER NOTES
42-year-old female with a history of polysubstance abuse, anemia, and chronic kidney disease who is 24 weeks and 2 days pregnant presents in police custody for medical clearance with a chief complaint of possible withdrawal.  Evidently she had a urine tox screen done at the [facility] and it was positive for multiple drugs of abuse. She states she regularly uses heroin, alcohol, cocaine and has not had any of these in the last 12 hours. She claims that several hours ago she began to experience body aches and her skin feels hot. No other complaints. Feeling baby move. She has not yet seen an OB/GYN, stating she was planning to have an elective D&C. Pregnancy Problem    Associated symptoms include myalgias. Pertinent negatives include no fever, no diarrhea, no nausea, no constipation, no dysuria, no frequency, no headaches, no arthralgias and no chest pain.         Past Medical History:   Diagnosis Date    Abnormal Pap smear     Adjustment disorder with mixed anxiety and depressed mood 10/24/2018    Anemia     Chronic kidney disease     Chronic pain     abdominal,lower right flank    Hydronephrosis, right 8/22/2014       Past Surgical History:   Procedure Laterality Date    HX GYN      vaginal birth x 2    HX OTHER SURGICAL  2016 August     urethral stent    HX OTHER SURGICAL  03/2017    urethral stents replaced multiple times, last placed right urethal stent in march 2017    HX UROLOGICAL      stent placements         Family History:   Problem Relation Age of Onset    Elevated Lipids Mother     Hypertension Mother     Cancer Paternal Aunt     Cancer Paternal Uncle     Arthritis-osteo Maternal Grandmother     Diabetes Maternal Grandmother     Cancer Paternal Grandmother     Diabetes Paternal Grandmother        Social History     Socioeconomic History    Marital status: SINGLE     Spouse name: Not on file    Number of children: Not on file    Years of education: Not on file    Highest education level: Not on file   Occupational History    Not on file   Social Needs    Financial resource strain: Not on file    Food insecurity:     Worry: Not on file     Inability: Not on file    Transportation needs:     Medical: Not on file     Non-medical: Not on file   Tobacco Use    Smoking status: Current Some Day Smoker     Years: 2.00    Smokeless tobacco: Never Used    Tobacco comment: socially   Substance and Sexual Activity    Alcohol use: Yes     Alcohol/week: 1.0 standard drinks     Types: 1 Standard drinks or equivalent per week     Frequency: Never     Comment: socially    Drug use: Yes     Types: Heroin, Cocaine     Comment: last used cocain 12/16 and heroin on 12/19    Sexual activity: Yes     Partners: Male     Birth control/protection: None   Lifestyle    Physical activity:     Days per week: Not on file     Minutes per session: Not on file    Stress: Not on file   Relationships    Social connections:     Talks on phone: Not on file     Gets together: Not on file     Attends [employer] service: Not on file     Active member of club or organization: Not on file     Attends meetings of clubs or organizations: Not on file     Relationship status: Not on file    Intimate partner violence:     Fear of current or ex partner: Not on file     Emotionally abused: Not on file     Physically abused: Not on file     Forced sexual activity: Not on file   Other Topics Concern    Not on file   Social History Narrative    Not on file         ALLERGIES: Patient has no known allergies. Review of Systems   Constitutional: Negative. Negative for chills, fever and unexpected weight change. HENT: Negative. Negative for congestion and trouble swallowing. Eyes: Negative for discharge. Respiratory: Negative. Negative for cough, chest tightness and shortness of breath. Cardiovascular: Negative. Negative for chest pain. Gastrointestinal: Negative.   Negative for abdominal distention, abdominal pain, constipation, diarrhea and nausea. Endocrine: Negative. Genitourinary: Negative. Negative for difficulty urinating, dysuria, frequency and urgency. Musculoskeletal: Positive for myalgias. Negative for arthralgias. Skin: Negative. Negative for color change. Allergic/Immunologic: Negative. Neurological: Negative. Negative for dizziness, speech difficulty and headaches. Hematological: Negative. Psychiatric/Behavioral: Negative. Negative for agitation and confusion. All other systems reviewed and are negative. Vitals:    12/19/19 2116   BP: 141/70   Pulse: 79   Resp: 16   Temp: 98 °F (36.7 °C)   SpO2: 100%   Weight: 69.4 kg (153 lb)   Height: 5' 6\" (1.676 m)            Physical Exam  Vitals signs reviewed. Constitutional:       Appearance: She is well-developed. HENT:      Head: Normocephalic and atraumatic. Eyes:      Conjunctiva/sclera: Conjunctivae normal.   Neck:      Musculoskeletal: Neck supple. Cardiovascular:      Rate and Rhythm: Normal rate and regular rhythm. Pulmonary:      Effort: Pulmonary effort is normal. No respiratory distress. Abdominal:      Palpations: Abdomen is soft. Tenderness: There is no tenderness. Comments: Gravid uterus   Musculoskeletal: Normal range of motion. General: No deformity. Skin:     General: Skin is warm and dry. Neurological:      Mental Status: She is alert and oriented to person, place, and time. Psychiatric:         Behavior: Behavior normal.         Thought Content: Thought content normal.          MDM  Number of Diagnoses or Management Options  24 weeks gestation of pregnancy:   Polysubstance abuse [facility]):   Diagnosis management comments: Vital signs stable. Patient well-appearing in no apparent distress. No evidence of acute withdrawal.  Will DC with Zofran. Police assure me she will only be hospitalized overnight.   I encouraged her to follow-up with OB/GYN tomorrow to discuss methadone if she wishes to keep the pregnancy         Bedside US  Date/Time: 12/19/2019 9:52 PM  Performed by: Brynda Romberg, MD  Authorized by: Brynda Romberg, MD     Written consent obtained: Yes    Given by:  Patient  Performed by: Attending  Type of procedure: Focused pelvic ultrasound obstetrical  Indications:  Pregnant by patient history  Transabdominal sagittal:  Adequate  Transabdominal transverse:  Adequate  Endovaginal sagittal:  Not obtained  Endovaginal coronal:  Not obtained  Intrauterine Pregnancy:  Present  Fetal heart    FHR (bpm):  135  Fetal motion    Interpretation:  Live intrauterine pregnancy  Transabdominal sagittal:  Adequate  Transabdominal transverse:  Adequate  Endovaginal sagittal:  Not obtained  Endovaginal coronal:  Not obtained  Left eye:     Confirmation study:  I have advised the patient to obtain a confirmatory study as an outpatient. LABORATORY TESTS:  Recent Results (from the past 12 hour(s))   EKG, 12 LEAD, INITIAL    Collection Time: 12/19/19  9:57 PM   Result Value Ref Range    Ventricular Rate 63 BPM    Atrial Rate 63 BPM    P-R Interval 136 ms    QRS Duration 78 ms    Q-T Interval 414 ms    QTC Calculation (Bezet) 423 ms    Calculated R Axis 74 degrees    Calculated T Axis 53 degrees    Diagnosis       Normal sinus rhythm  Normal ECG  No previous ECGs available         IMAGING RESULTS:  No orders to display       MEDICATIONS GIVEN:  Medications - No data to display    IMPRESSION:  1. Polysubstance abuse ([address]    2. 24 weeks gestation of pregnancy        PLAN:  1. Discharge Medication List as of 12/19/2019 10:11 PM      START taking these medications    Details   prenatal multivit-ca-min-fe-fa (PRENATAL VITAMIN) tab Take 1 Tab by mouth daily. , Print, Disp-30 Tab, R-0      ondansetron (ZOFRAN ODT) 4 mg disintegrating tablet Take 1 Tab by mouth every eight (8) hours as needed for Nausea. , Print, Disp-10 Tab, R-0           2.    Follow-up Information     Follow up With Specialties Details Why Contact Info    u Department Of Obstetrics & Gynecology  Schedule an appointment as soon as possible for a visit  [phone] [id number]  [phone]    [facility] EMERGENCY DEPT Emergency Medicine  As needed, If symptoms worsen [address] [phone]        Return to ED if worse

## 2019-12-20 NOTE — DISCHARGE INSTRUCTIONS
Patient Education        Substance Use Disorder: Care Instructions  Overview    You can improve your life and health by stopping your use of alcohol or drugs. When you don't drink or use drugs, you may feel and sleep better. You may get along better with your family, friends, and coworkers. There are medicines and programs that can help with substance use disorder. How can you care for yourself at home? · If you have been given medicine to help keep you sober or reduce your cravings, be sure to take it as prescribed. · Talk to your doctor about programs that can help you stop using drugs or drinking alcohol. · If your doctor prescribes disulfiram (Antabuse), do not drink any alcohol while you are taking this medicine. You may have severe, even life-threatening, side effects from even small amounts of alcohol. · Do not tempt yourself by keeping alcohol or drugs in your home. · Learn how to say no when other people drink or use drugs. Or don't spend time with people who drink or use drugs. · Use the time and money spent on drinking or drugs to do something fun with your family or friends. Preventing a relapse  · Do not drink alcohol or use drugs at all. Using any amount of alcohol or drugs greatly increases your risk for relapse. · Seek help from organizations such as Alcoholics Anonymous, Narcotics Anonymous, or treatment facilities if you feel the need to drink alcohol or use drugs again. · Remember that recovery is a lifelong process. · Stay away from situations, friends, or places that may lead you to drink or use drugs. · Have a plan to spot and deal with relapse. Learn to recognize changes in your thinking that lead you to drink or use drugs. These are warning signs. Get help before you start to drink or use drugs again. · Get help as soon as you can if you relapse. Some people make a plan with another person that outlines what they want that person to do for them if they relapse.  The plan usually includes how to handle the relapse and who to notify in case of relapse. · Don't give up. Remember that a relapse does not mean that you have failed. Use the experience to learn the triggers that lead you to drink or use drugs. Then quit again. Many people have several relapses before they are able to quit for good. Follow-up care is a key part of your treatment and safety. Be sure to make and go to all appointments, and call your doctor if you are having problems. It's also a good idea to know your test results and keep a list of the medicines you take. When should you call for help? Call  911 anytime you think you may need emergency care. For example, call if:    · You feel you cannot stop from hurting yourself or someone else.   [facility] your doctor now or seek immediate medical care if:    · You have serious withdrawal symptoms, such as confusion, hallucinations, severe trembling, or seizures.    Watch closely for changes in your health, and be sure to contact your doctor if:    · You have a relapse.     · You need more help or support to stop. Where can you learn more? Go to http://isabel-shabbir.info/. Enter [id number] in the search box to learn more about \"Substance Use Disorder: Care Instructions. \"  Current as of: February 5, 2019  Content Version: 12.2  © 3341-2437 [employer]. Care instructions adapted under license by [employer] (which disclaims liability or warranty for this information). If you have questions about a medical condition or this instruction, always ask your healthcare professional. [place] [address] any warranty or liability for your use of this information.

## 2019-12-20 NOTE — ED NOTES
Patient aware of CPS being contacted tonight and understands why. States she has open case in [place]. States she has 3 other children that are currently in the care of her mother, Krystle Sewell [phone]. Mother resides at [address] [place] [address] C, [phone]. Patient reports her address is [address] [place] [address] and best number for her is [phone]. Patient reports her children will probably be with her mother for the next two weeks. Patient is apparently being arrested tonight for outstanding warrants along for possession of cocaine and heroin per officer at bedside.

## 2019-12-20 NOTE — ED NOTES
Patient reports last used cocaine 3 days ago. Last used heroin about 12 hours ago. Here with [employer] for medical clearance before going to shelter overnight. Patient may not have had any pre-theron care. Has apparently had US recently at a clinic but has not had any consistent prenatal care per Dr. Radha Montes. Patient telling Dr. Radha Montes her due date is 2020 which place her currently at about 24 weeks, 2 days.

## 2019-12-22 ENCOUNTER — HOSPITAL ENCOUNTER (EMERGENCY)
Age: 29
Discharge: LEFT BEFORE DISCHARGE | End: 2019-12-22
Attending: EMERGENCY MEDICINE
Payer: COMMERCIAL

## 2019-12-22 VITALS
DIASTOLIC BLOOD PRESSURE: 83 MMHG | BODY MASS INDEX: 24.8 KG/M2 | WEIGHT: 140 LBS | RESPIRATION RATE: 18 BRPM | HEIGHT: 63 IN | TEMPERATURE: 98.7 F | SYSTOLIC BLOOD PRESSURE: 147 MMHG | OXYGEN SATURATION: 98 % | HEART RATE: 90 BPM

## 2019-12-22 PROCEDURE — 75810000275 HC EMERGENCY DEPT VISIT NO LEVEL OF CARE
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
## 2020-01-01 ENCOUNTER — HOSPITAL ENCOUNTER (EMERGENCY)
Age: 30
Discharge: LEFT BEFORE DISCHARGE | End: 2020-01-01
Attending: EMERGENCY MEDICINE | Admitting: EMERGENCY MEDICINE
Payer: COMMERCIAL

## 2020-01-01 VITALS
WEIGHT: 130 LBS | HEIGHT: 63 IN | HEART RATE: 98 BPM | RESPIRATION RATE: 18 BRPM | SYSTOLIC BLOOD PRESSURE: 128 MMHG | DIASTOLIC BLOOD PRESSURE: 77 MMHG | TEMPERATURE: 98.1 F | OXYGEN SATURATION: 98 % | BODY MASS INDEX: 23.04 KG/M2

## 2020-01-01 DIAGNOSIS — Z53.21 PATIENT LEFT WITHOUT BEING SEEN: Primary | ICD-10-CM

## 2020-01-01 PROCEDURE — 75810000275 HC EMERGENCY DEPT VISIT NO LEVEL OF CARE
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
## 2020-05-02 ENCOUNTER — HOSPITAL ENCOUNTER (EMERGENCY)
Age: 30
Discharge: HOME | End: 2020-05-02
Attending: EMERGENCY MEDICINE | Admitting: EMERGENCY MEDICINE
Payer: COMMERCIAL

## 2020-05-02 VITALS
OXYGEN SATURATION: 98 % | HEART RATE: 68 BPM | TEMPERATURE: 98.1 F | RESPIRATION RATE: 19 BRPM | DIASTOLIC BLOOD PRESSURE: 57 MMHG | SYSTOLIC BLOOD PRESSURE: 108 MMHG

## 2020-05-02 DIAGNOSIS — F19.10 POLYSUBSTANCE ABUSE (HCC): ICD-10-CM

## 2020-05-02 DIAGNOSIS — T74.21XA SEXUAL ASSAULT OF ADULT, INITIAL ENCOUNTER: Primary | ICD-10-CM

## 2020-05-02 LAB
COMMENT, HOLDF: NORMAL
HCG SERPL QL: NEGATIVE
SAMPLES BEING HELD,HOLD: NORMAL

## 2020-05-02 PROCEDURE — 99284 EMERGENCY DEPT VISIT MOD MDM: CPT

## 2020-05-02 PROCEDURE — 86592 SYPHILIS TEST NON-TREP QUAL: CPT

## 2020-05-02 PROCEDURE — 36415 COLL VENOUS BLD VENIPUNCTURE: CPT

## 2020-05-02 PROCEDURE — 84703 CHORIONIC GONADOTROPIN ASSAY: CPT

## 2020-05-02 RX ORDER — AZITHROMYCIN 250 MG/1
2000 TABLET, FILM COATED ORAL ONCE
Status: DISCONTINUED | OUTPATIENT
Start: 2020-05-02 | End: 2020-05-02 | Stop reason: HOSPADM

## 2020-05-02 RX ORDER — LEVONORGESTREL 1.5 MG/1
1.5 TABLET ORAL ONCE
Status: DISCONTINUED | OUTPATIENT
Start: 2020-05-02 | End: 2020-05-02 | Stop reason: HOSPADM

## 2020-05-02 NOTE — ED NOTES
1000: Patient rang call bell and asked for help using telephone. She is attempting to call numbers of treatment facilities left by [facility] counselor. She is tearful and stating she would like to get treatment for drug addiction to regain custody of her four children. She asked for assistance in calling numbers. RN assisted patient in reaching out to Kristie Tobin at [facility], placement pending. Updated mother with patients permission. 1118: Patient became anxious and called ride. She reported she would be going to her mothers house. She left against medical advice. Attempted to redirect patient, however she verbalized understandings of risks/benefits of leaving vs. Staying. She refused to sign AMA form. Dr. De La Rosa Mouse aware.

## 2020-05-02 NOTE — BSMART NOTE
Provided SA resources including [facility] Crisis Numbers and information for [employer]. Patient was provided with list of AA meetings as well.
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
## 2020-05-02 NOTE — ED NOTES
Patient asked to remove pants because she felt they were \"dirty\". Pants placed in paper bag. Given paper scrubs. FROYLAN Sidhu updated. Per FNE she will be with patient ASAP. Patient updated.
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
## 2020-05-04 LAB — RPR SER QL: NONREACTIVE

## 2021-05-29 ENCOUNTER — HOSPITAL ENCOUNTER (EMERGENCY)
Age: 31
Discharge: HOME | End: 2021-05-29
Attending: OBSTETRICS & GYNECOLOGY | Admitting: OBSTETRICS & GYNECOLOGY
Payer: COMMERCIAL

## 2021-05-29 VITALS
HEART RATE: 78 BPM | SYSTOLIC BLOOD PRESSURE: 115 MMHG | BODY MASS INDEX: 34.37 KG/M2 | TEMPERATURE: 98.2 F | HEIGHT: 67 IN | WEIGHT: 219 LBS | OXYGEN SATURATION: 99 % | DIASTOLIC BLOOD PRESSURE: 57 MMHG | RESPIRATION RATE: 16 BRPM

## 2021-05-29 LAB
ALBUMIN SERPL-MCNC: 2.9 G/DL (ref 3.5–5)
ALBUMIN/GLOB SERPL: 0.7 {RATIO} (ref 1.1–2.2)
ALP SERPL-CCNC: 64 U/L (ref 45–117)
ALT SERPL-CCNC: 18 U/L (ref 12–78)
ANION GAP SERPL CALC-SCNC: 5 MMOL/L (ref 5–15)
AST SERPL-CCNC: 14 U/L (ref 15–37)
BASOPHILS # BLD: 0 K/UL (ref 0–0.1)
BASOPHILS NFR BLD: 0 % (ref 0–1)
BILIRUB SERPL-MCNC: 0.4 MG/DL (ref 0.2–1)
BUN SERPL-MCNC: 6 MG/DL (ref 6–20)
BUN/CREAT SERPL: 9 (ref 12–20)
CALCIUM SERPL-MCNC: 9.1 MG/DL (ref 8.5–10.1)
CHLORIDE SERPL-SCNC: 107 MMOL/L (ref 97–108)
CO2 SERPL-SCNC: 24 MMOL/L (ref 21–32)
CREAT SERPL-MCNC: 0.64 MG/DL (ref 0.55–1.02)
CREAT UR-MCNC: 64.4 MG/DL
DIFFERENTIAL METHOD BLD: ABNORMAL
EOSINOPHIL # BLD: 0.1 K/UL (ref 0–0.4)
EOSINOPHIL NFR BLD: 1 % (ref 0–7)
ERYTHROCYTE [DISTWIDTH] IN BLOOD BY AUTOMATED COUNT: 12.8 % (ref 11.5–14.5)
GLOBULIN SER CALC-MCNC: 4.3 G/DL (ref 2–4)
GLUCOSE SERPL-MCNC: 89 MG/DL (ref 65–100)
HCT VFR BLD AUTO: 32.9 % (ref 35–47)
HGB BLD-MCNC: 11 G/DL (ref 11.5–16)
IMM GRANULOCYTES # BLD AUTO: 0 K/UL (ref 0–0.04)
IMM GRANULOCYTES NFR BLD AUTO: 0 % (ref 0–0.5)
LDH SERPL L TO P-CCNC: 171 U/L (ref 81–246)
LYMPHOCYTES # BLD: 2.5 K/UL (ref 0.8–3.5)
LYMPHOCYTES NFR BLD: 23 % (ref 12–49)
MCH RBC QN AUTO: 29.5 PG (ref 26–34)
MCHC RBC AUTO-ENTMCNC: 33.4 G/DL (ref 30–36.5)
MCV RBC AUTO: 88.2 FL (ref 80–99)
MONOCYTES # BLD: 0.5 K/UL (ref 0–1)
MONOCYTES NFR BLD: 5 % (ref 5–13)
NEUTS SEG # BLD: 7.5 K/UL (ref 1.8–8)
NEUTS SEG NFR BLD: 71 % (ref 32–75)
NRBC # BLD: 0 K/UL (ref 0–0.01)
NRBC BLD-RTO: 0 PER 100 WBC
PLATELET # BLD AUTO: 306 K/UL (ref 150–400)
PMV BLD AUTO: 10.1 FL (ref 8.9–12.9)
POTASSIUM SERPL-SCNC: 3.8 MMOL/L (ref 3.5–5.1)
PROT SERPL-MCNC: 7.2 G/DL (ref 6.4–8.2)
PROT UR-MCNC: 9 MG/DL (ref 0–11.9)
PROT/CREAT UR-RTO: 0.1
RBC # BLD AUTO: 3.73 M/UL (ref 3.8–5.2)
SODIUM SERPL-SCNC: 136 MMOL/L (ref 136–145)
WBC # BLD AUTO: 10.6 K/UL (ref 3.6–11)

## 2021-05-29 PROCEDURE — 99285 EMERGENCY DEPT VISIT HI MDM: CPT

## 2021-05-29 PROCEDURE — 82570 ASSAY OF URINE CREATININE: CPT

## 2021-05-29 PROCEDURE — 36415 COLL VENOUS BLD VENIPUNCTURE: CPT

## 2021-05-29 PROCEDURE — 80053 COMPREHEN METABOLIC PANEL: CPT

## 2021-05-29 PROCEDURE — 85025 COMPLETE CBC W/AUTO DIFF WBC: CPT

## 2021-05-29 PROCEDURE — 83615 LACTATE (LD) (LDH) ENZYME: CPT

## 2021-05-29 NOTE — PROGRESS NOTES
1431:  reviewed discharge instructions with the patient. The patient verbalized understanding. All questions and concerns were addressed. The patient  is discharged ambulatory in the care of [facility] . Pt is alert and oriented x 4. Respirations are clear and unlabored.

## 2021-05-29 NOTE — H&P
OB History & Physical    Name: Zakiya Almaraz MRN: [id number]  SSN: [id number]    YOB: 1990  Age: 31 y.o. Sex: female      Subjective:     Chief Complaint: spots and dizziness    History of Present Illness: Zakiya Almaraz is a 31 y.o.  female with an estimated gestational age of 18w3d with Estimated Date of Delivery: 21. Patient complains of spot and dizziness about a week ago, then recurred today. She felt nauseated after this episode and was evaluated. She was seen at the health clinic at [facility]. She has otherwise not had prenatal care for this pregnancy. She states she is using nicotine patches, prenatal vitamins. She also reports some mild cramping today. No vaginal bleeding. She has history of pre eclampsia. She states this was during her last pregnancy and was diagnosed around 24-25 weeks. She her last pregnancy was in  and she presented with IUFD at 37 weeks. She said she saw Dr Rosalba Maldonado. By record review, it appears her last pregnancy was . Notes from  from Dr Rosalba Maldonado indicate she had induction for favorable cervix at term with Hx of IUFD, indicating her IUFD was prior. Unable to see any notes past that. She may be a poor historian. She also has history of cocaine and heroin use prior to her arrest and states she has been clean while at the facility. Denies any drug use recently. OB History        6    Para   4    Term   4       0    AB   1    Living   3       SAB   1    TAB   0    Ectopic   0    Molar        Multiple   0    Live Births   3          Obstetric Comments    x 4, one pregnancy stillbirth at term--no fetal heart tones--possible preeclampsia         She reports all vaginal births, 5 total, one IUFD at 36wks with pre eclampsia.  One premature birth due to placental abruption--- this was also vaginal.   [id number]    Past Medical History:   Diagnosis Date    Abnormal Pap smear     Adjustment disorder with mixed anxiety and depressed mood 10/24/2018    Anemia     Chronic kidney disease     Chronic pain     abdominal,lower right flank    Hydronephrosis, right 8/22/2014     Past Surgical History:   Procedure Laterality Date    HX GYN      vaginal birth x 2    HX OTHER SURGICAL  2016 August     urethral stent    HX OTHER SURGICAL  03/2017    urethral stents replaced multiple times, last placed right urethal stent in march 2017    HX UROLOGICAL      stent placements   reviewed surgical history    Social History     Occupational History    Not on file   Tobacco Use    Smoking status: Never Smoker    Smokeless tobacco: Never Used    Tobacco comment: socially   Substance and Sexual Activity    Alcohol use: Not Currently     Alcohol/week: 1.0 standard drinks     Types: 1 Standard drinks or equivalent per week     Comment: socially    Drug use: Not Currently     Types: Heroin, Cocaine     Comment: last used cocain 12/16 and heroin on 12/19    Sexual activity: Yes     Partners: Male     Birth control/protection: None     Family History   Problem Relation Age of Onset    Elevated Lipids Mother     Hypertension Mother     Cancer Paternal Aunt     Cancer Paternal Uncle     Arthritis-osteo Maternal Grandmother     Diabetes Maternal Grandmother     Cancer Paternal Grandmother     Diabetes Paternal Grandmother        Allergies   Allergen Reactions    Penicillins Rash     Prior to Admission medications    Medication Sig Start Date End Date Taking? Authorizing Provider   prenatal multivit-ca-min-fe-fa (PRENATAL VITAMIN) tab Take 1 Tab by mouth daily. 12/19/19  Yes Laura Hector MD   ondansetron (ZOFRAN ODT) 4 mg disintegrating tablet Take 1 Tab by mouth every eight (8) hours as needed for Nausea.   Patient not taking: Reported on 5/29/2021 12/19/19   Laura Hector MD        Review of Systems    Objective:     Vitals:    Vitals:    05/29/21 1300 05/29/21 1303 05/29/21 1307 05/29/21 1315   BP:  130/76 130/76 (!) 115/57   Pulse: 86 86 78   Resp:   16    Temp:   98.2 °F (36.8 °C)    SpO2:   99%    Weight: 99.3 kg (219 lb)      Height: 5' 7\" (1.702 m)         Temp (24hrs), Av.2 °F (36.8 °C), Min:98.2 °F (36.8 °C), Max:98.2 °F (36.8 °C)    BP  Min: 115/57  Max: 130/76     Physical Exam  General: in NAD  HEENT: normocephalic  Cardiac- regular rate and rhythm  Lungs- clear to auscultation bilaterally  Abdomen: Gravid, soft, nontender  Extremities: no abnormal cyanosis, clubbing, edema  Skin: warm, dry, no abnormal lesions noted  Neurological: DTR's 1-2+  Pelvic:Cervical Exam: not checked  Uterine Activity: no contractions detected  Fetal Heart Rate: 152    LABS  Results for Hoda Guillermo (MRN [id number]) as of 2021 14:24   Ref. Range 2021 13:25   WBC Latest Ref Range: 3.6 - 11.0 K/uL 10.6   NRBC Latest Ref Range: 0  WBC 0.0   RBC Latest Ref Range: 3.80 - 5.20 M/uL 3.73 (L)   HGB Latest Ref Range: 11.5 - 16.0 g/dL 11.0 (L)   HCT Latest Ref Range: 35.0 - 47.0 % 32.9 (L)   MCV Latest Ref Range: 80.0 - 99.0 FL 88.2   MCH Latest Ref Range: 26.0 - 34.0 PG 29.5   MCHC Latest Ref Range: 30.0 - 36.5 g/dL 33.4   RDW Latest Ref Range: 11.5 - 14.5 % 12.8   PLATELET Latest Ref Range: 150 - 400 K/uL 306   MPV Latest Ref Range: 8.9 - 12.9 FL 10.1   NEUTROPHILS Latest Ref Range: 32 - 75 % 71   LYMPHOCYTES Latest Ref Range: 12 - 49 % 23   MONOCYTES Latest Ref Range: 5 - 13 % 5   EOSINOPHILS Latest Ref Range: 0 - 7 % 1   BASOPHILS Latest Ref Range: 0 - 1 % 0   IMMATURE GRANULOCYTES Latest Ref Range: 0.0 - 0.5 % 0   DF Latest Units:   AUTOMATED   ABSOLUTE NRBC Latest Ref Range: 0.00 - 0.01 K/uL 0.00   ABS. NEUTROPHILS Latest Ref Range: 1.8 - 8.0 K/UL 7.5   ABS. IMM. GRANS. Latest Ref Range: 0.00 - 0.04 K/UL 0.0   ABS. LYMPHOCYTES Latest Ref Range: 0.8 - 3.5 K/UL 2.5   ABS. MONOCYTES Latest Ref Range: 0.0 - 1.0 K/UL 0.5   ABS. EOSINOPHILS Latest Ref Range: 0.0 - 0.4 K/UL 0.1   ABS.  BASOPHILS Latest Ref Range: 0.0 - 0.1 K/UL 0.0   Sodium Latest Ref Range: 136 - 145 mmol/L 136   Potassium Latest Ref Range: 3.5 - 5.1 mmol/L 3.8   Chloride Latest Ref Range: 97 - 108 mmol/L 107   CO2 Latest Ref Range: 21 - 32 mmol/L 24   Anion gap Latest Ref Range: 5 - 15 mmol/L 5   Glucose Latest Ref Range: 65 - 100 mg/dL 89   BUN Latest Ref Range: 6 - 20 MG/DL 6   Creatinine Latest Ref Range: 0.55 - 1.02 MG/DL 0.64   BUN/Creatinine ratio Latest Ref Range: 12 - 20   9 (L)   Calcium Latest Ref Range: 8.5 - 10.1 MG/DL 9.1   GFR est non-AA Latest Ref Range: >60 ml/min/1.73m2 >60   GFR est AA Latest Ref Range: >60 ml/min/1.73m2 >60   Bilirubin, total Latest Ref Range: 0.2 - 1.0 MG/DL 0.4   Protein, total Latest Ref Range: 6.4 - 8.2 g/dL 7.2   Albumin Latest Ref Range: 3.5 - 5.0 g/dL 2.9 (L)   Globulin Latest Ref Range: 2.0 - 4.0 g/dL 4.3 (H)   A-G Ratio Latest Ref Range: 1.1 - 2.2   0.7 (L)   ALT Latest Ref Range: 12 - 78 U/L 18   AST Latest Ref Range: 15 - 37 U/L 14 (L)   Alk. phosphatase Latest Ref Range: 45 - 117 U/L 64   LD Latest Ref Range: 81 - 246 U/L 171   Creatinine, urine Latest Units: mg/dL 64.40   Protein, urine random Latest Ref Range: 0.0 - 11.9 mg/dL 9   Protein/Creat. urine Ratio Latest Units:   0.1   PROTEIN/CREATININE RATIO, URINE Unknown Rpt       Assessment and Plan:        @ 23w4d with spots in vision, dizziness, cramping    1. IUP- reassuring fetal heart tones    2. [place]/dizziness- blood pressure is normal here, CBC, CMP, LDH and Pr:Cr ratio sent. Exam is reassuring. At facility, BP was 148/98. Review of her history available in Epic does not coincide with what patient recalls. If work up here is negative, will discharge to facility. I encouraged her to stay well hydrated and eat regular meals as low blood pressure and hypoglycemia can cause some of the symptoms she experienced as well. 3. Cramping- patient in no distress, no contractions on tocometer. Did not feel cervical check was necessary at this time. She was given oral hydration.      4 Tobacco User    Labs normal, discharged      Signed By:  Gualberto Barnes MD     May 29, 2021
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
## 2021-05-29 NOTE — DISCHARGE INSTRUCTIONS
Patient Education        Learning About Pregnancy  Your Care Instructions     Your health in the early weeks of your pregnancy is particularly important for your baby's health. Take good care of yourself. Anything you do that harms your body can also harm your baby. Make sure to go to all of your doctor appointments. Regular checkups will help keep you and your baby healthy. How can you care for yourself at home? Diet    · Eat a balanced diet. Make sure your diet includes plenty of beans, peas, and leafy green vegetables.     · Do not skip meals or go for many hours without eating. If you are nauseated, try to eat a small, healthy snack every 2 to 3 hours.     · Do not eat fish that has a high level of mercury, such as shark, swordfish, or mackerel. Do not eat more than one can of tuna each week.     · Drink plenty of fluids. If you have kidney, heart, or liver disease and have to limit fluids, talk with your doctor before you increase the amount of fluids you drink.     · Cut down on caffeine, such as coffee, tea, and cola.     · Do not drink alcohol, such as beer, wine, or hard liquor.     · Take a multivitamin that contains at least 400 micrograms (mcg) of folic acid to help prevent birth defects. Fortified cereal and whole wheat bread are good additional sources of folic acid.     · Increase the calcium in your diet. Try to drink a quart of skim milk each day. You may also take calcium supplements and choose foods such as cheese and yogurt. Lifestyle    · Make sure you go to your follow-up appointments.     · Get plenty of rest. You may be unusually tired while you are pregnant.     · Get at least 30 minutes of exercise on most days of the week. Walking is a good choice. If you have not exercised in the past, start out slowly. Take several short walks each day.     · Do not smoke. If you need help quitting, talk to your doctor about stop-smoking programs.  These can increase your chances of quitting for good.     · Do not touch cat feces or litter boxes. Also, wash your hands after you handle raw meat, and fully cook all meat before you eat it. Wear gloves when you work in the yard or garden, and wash your hands well when you are done. Cat feces, raw or undercooked meat, and contaminated dirt can cause an infection that may harm your baby or lead to a miscarriage.     · Do not use saunas or hot tubs. Raising your body temperature may harm your baby.     · Avoid chemical fumes, paint fumes, or poisons.     · Do not use illegal drugs, marijuana, or alcohol. Medicines    · Review all of your medicines with your doctor. Some of your routine medicines may need to be changed to protect your baby.     · Use acetaminophen (Tylenol) to relieve minor problems, such as a mild headache or backache or a mild fever with cold symptoms. Do not use nonsteroidal anti-inflammatory drugs (NSAIDs), such as ibuprofen (Advil, Motrin) or naproxen (Aleve), unless your doctor says it is okay.     · Do not take two or more pain medicines at the same time unless the doctor told you to. Many pain medicines have acetaminophen, which is Tylenol. Too much acetaminophen (Tylenol) can be harmful.     · Take your medicines exactly as prescribed. Call your doctor if you think you are having a problem with your medicine. To manage morning sickness    · If you feel sick when you first wake up, try eating a small snack (such as crackers) before you get out of bed. Allow some time to digest the snack, and then get out of bed slowly.     · Do not skip meals or go for long periods without eating. An empty stomach can make nausea worse.     · Eat small, frequent meals instead of three large meals each day.     · Drink plenty of fluids.     · Eat foods that are high in protein but low in fat.     · If you are taking iron supplements, ask your doctor if they are necessary.  Iron can make nausea worse.     · Avoid any smells, such as coffee, that make you feel sick.     · Get lots of rest. Morning sickness may be worse when you are tired. Follow-up care is a key part of your treatment and safety. Be sure to make and go to all appointments, and call your doctor if you are having problems. It's also a good idea to know your test results and keep a list of the medicines you take. Where can you learn more? Go to http://www.gray.com/  Enter I813 in the search box to learn more about \"Learning About Pregnancy. \"  Current as of: October 8, 2020               Content Version: 12.8  © 0231-6575 [employer], [employer]. Care instructions adapted under license by [employer] (which disclaims liability or warranty for this information). If you have questions about a medical condition or this instruction, always ask your healthcare professional. [address] any warranty or liability for your use of this information.

## 2022-03-18 PROBLEM — F43.23 ADJUSTMENT DISORDER WITH MIXED ANXIETY AND DEPRESSED MOOD: Status: ACTIVE | Noted: 2018-10-24

## 2022-03-19 PROBLEM — Q62.62: Status: ACTIVE | Noted: 2018-10-24

## 2022-03-19 PROBLEM — F10.10 ALCOHOL ABUSE: Status: ACTIVE | Noted: 2019-09-16

## 2022-03-20 PROBLEM — G56.03 BILATERAL CARPAL TUNNEL SYNDROME: Status: ACTIVE | Noted: 2019-08-06

## 2023-11-12 ENCOUNTER — HOSPITAL ENCOUNTER (EMERGENCY)
Facility: HOSPITAL | Age: 33
Discharge: HOME | End: 2023-11-13
Attending: EMERGENCY MEDICINE

## 2023-11-12 ENCOUNTER — APPOINTMENT (OUTPATIENT)
Facility: HOSPITAL | Age: 33
End: 2023-11-12

## 2023-11-12 DIAGNOSIS — T74.21XA SEXUAL ASSAULT OF ADULT, INITIAL ENCOUNTER: Primary | ICD-10-CM

## 2023-11-12 PROCEDURE — 74018 RADEX ABDOMEN 1 VIEW: CPT

## 2023-11-12 PROCEDURE — 96372 THER/PROPH/DIAG INJ SC/IM: CPT

## 2023-11-12 PROCEDURE — 99284 EMERGENCY DEPT VISIT MOD MDM: CPT

## 2023-11-12 PROCEDURE — 99281 EMR DPT VST MAYX REQ PHY/QHP: CPT

## 2023-11-12 RX ORDER — AZITHROMYCIN 250 MG/1
1000 TABLET, FILM COATED ORAL
Status: COMPLETED | OUTPATIENT
Start: 2023-11-12 | End: 2023-11-13

## 2023-11-13 VITALS
RESPIRATION RATE: 18 BRPM | OXYGEN SATURATION: 99 % | TEMPERATURE: 98.7 F | DIASTOLIC BLOOD PRESSURE: 84 MMHG | WEIGHT: 175 LBS | BODY MASS INDEX: 27.47 KG/M2 | SYSTOLIC BLOOD PRESSURE: 141 MMHG | HEIGHT: 67 IN | HEART RATE: 117 BPM

## 2023-11-13 LAB
C TRACH DNA SPEC QL NAA+PROBE: NEGATIVE
CLUE CELLS VAG QL WET PREP: NORMAL
KOH PREP SPEC: NORMAL
N GONORRHOEA DNA SPEC QL NAA+PROBE: NEGATIVE
SAMPLE TYPE: NORMAL
SERVICE CMNT-IMP: NORMAL
SERVICE CMNT-IMP: NORMAL
SPECIMEN SOURCE: NORMAL
T VAGINALIS VAG QL WET PREP: NORMAL

## 2023-11-13 PROCEDURE — 87210 SMEAR WET MOUNT SALINE/INK: CPT

## 2023-11-13 PROCEDURE — 6360000002 HC RX W HCPCS: Performed by: STUDENT IN AN ORGANIZED HEALTH CARE EDUCATION/TRAINING PROGRAM

## 2023-11-13 PROCEDURE — 87591 N.GONORRHOEAE DNA AMP PROB: CPT

## 2023-11-13 PROCEDURE — 2500000003 HC RX 250 WO HCPCS: Performed by: STUDENT IN AN ORGANIZED HEALTH CARE EDUCATION/TRAINING PROGRAM

## 2023-11-13 PROCEDURE — 87491 CHLMYD TRACH DNA AMP PROBE: CPT

## 2023-11-13 PROCEDURE — 6370000000 HC RX 637 (ALT 250 FOR IP): Performed by: STUDENT IN AN ORGANIZED HEALTH CARE EDUCATION/TRAINING PROGRAM

## 2023-11-13 RX ORDER — ACETAMINOPHEN 500 MG
1000 TABLET ORAL
Status: COMPLETED | OUTPATIENT
Start: 2023-11-13 | End: 2023-11-13

## 2023-11-13 RX ADMIN — LIDOCAINE HYDROCHLORIDE 500 MG: 10 INJECTION, SOLUTION EPIDURAL; INFILTRATION; INTRACAUDAL; PERINEURAL at 01:22

## 2023-11-13 RX ADMIN — ACETAMINOPHEN 1000 MG: 500 TABLET ORAL at 01:39

## 2023-11-13 RX ADMIN — AZITHROMYCIN DIHYDRATE 1000 MG: 250 TABLET, FILM COATED ORAL at 01:21

## 2023-11-13 ASSESSMENT — PAIN - FUNCTIONAL ASSESSMENT: PAIN_FUNCTIONAL_ASSESSMENT: 0-10

## 2023-11-13 ASSESSMENT — ENCOUNTER SYMPTOMS
ABDOMINAL PAIN: 0
SORE THROAT: 0
COUGH: 0
DIARRHEA: 0
VOMITING: 0
EYE PAIN: 0
SHORTNESS OF BREATH: 0
NAUSEA: 0

## 2023-11-13 ASSESSMENT — PAIN DESCRIPTION - FREQUENCY: FREQUENCY: CONTINUOUS

## 2023-11-13 ASSESSMENT — PAIN DESCRIPTION - LOCATION: LOCATION: VAGINA;OTHER (COMMENT)

## 2023-11-13 ASSESSMENT — PAIN SCALES - GENERAL: PAINLEVEL_OUTOF10: 10

## 2023-11-13 NOTE — ED NOTES
The patient left the Emergency Department ambulatory, alert and oriented and in no acute distress. The patient was encouraged to call or return to the ED for worsening issues or problems and was encouraged to schedule a follow up appointment for continuing care. The patient verbalized understanding of discharge instructions and all questions were answered. The patient has no further concerns at this time.         Laura Finley RN  11/13/23 5624
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
